# Patient Record
Sex: FEMALE | Race: WHITE | NOT HISPANIC OR LATINO | Employment: FULL TIME | URBAN - METROPOLITAN AREA
[De-identification: names, ages, dates, MRNs, and addresses within clinical notes are randomized per-mention and may not be internally consistent; named-entity substitution may affect disease eponyms.]

---

## 2023-04-24 ENCOUNTER — TELEPHONE (OUTPATIENT)
Dept: PRIMARY CARE | Facility: CLINIC | Age: 58
End: 2023-04-24
Payer: COMMERCIAL

## 2023-04-25 DIAGNOSIS — E78.2 MIXED HYPERLIPIDEMIA: Primary | ICD-10-CM

## 2023-05-19 ENCOUNTER — APPOINTMENT (OUTPATIENT)
Dept: PRIMARY CARE | Facility: CLINIC | Age: 58
End: 2023-05-19
Payer: COMMERCIAL

## 2023-12-08 ENCOUNTER — LAB (OUTPATIENT)
Dept: LAB | Facility: LAB | Age: 58
End: 2023-12-08
Payer: COMMERCIAL

## 2023-12-08 ENCOUNTER — OFFICE VISIT (OUTPATIENT)
Dept: OBSTETRICS AND GYNECOLOGY | Facility: CLINIC | Age: 58
End: 2023-12-08
Payer: COMMERCIAL

## 2023-12-08 VITALS
SYSTOLIC BLOOD PRESSURE: 110 MMHG | WEIGHT: 142 LBS | DIASTOLIC BLOOD PRESSURE: 78 MMHG | BODY MASS INDEX: 24.24 KG/M2 | HEIGHT: 64 IN

## 2023-12-08 DIAGNOSIS — Z12.4 CERVICAL CANCER SCREENING: ICD-10-CM

## 2023-12-08 DIAGNOSIS — Z01.419 WELL WOMAN EXAM: Primary | ICD-10-CM

## 2023-12-08 DIAGNOSIS — E78.2 MIXED HYPERLIPIDEMIA: ICD-10-CM

## 2023-12-08 DIAGNOSIS — Z12.31 BREAST CANCER SCREENING BY MAMMOGRAM: ICD-10-CM

## 2023-12-08 LAB
CHOLEST SERPL-MCNC: 208 MG/DL (ref 0–199)
CHOLESTEROL/HDL RATIO: 3.1
HDLC SERPL-MCNC: 66.2 MG/DL
LDLC SERPL CALC-MCNC: 125 MG/DL
NON HDL CHOLESTEROL: 142 MG/DL (ref 0–149)
TRIGL SERPL-MCNC: 82 MG/DL (ref 0–149)
VLDL: 16 MG/DL (ref 0–40)

## 2023-12-08 PROCEDURE — 99396 PREV VISIT EST AGE 40-64: CPT | Performed by: OBSTETRICS & GYNECOLOGY

## 2023-12-08 PROCEDURE — 1036F TOBACCO NON-USER: CPT | Performed by: OBSTETRICS & GYNECOLOGY

## 2023-12-08 PROCEDURE — 87624 HPV HI-RISK TYP POOLED RSLT: CPT

## 2023-12-08 PROCEDURE — 36415 COLL VENOUS BLD VENIPUNCTURE: CPT

## 2023-12-08 PROCEDURE — 88175 CYTOPATH C/V AUTO FLUID REDO: CPT

## 2023-12-08 PROCEDURE — 80061 LIPID PANEL: CPT

## 2023-12-08 RX ORDER — ASCORBIC ACID 500 MG
TABLET,CHEWABLE ORAL
COMMUNITY
Start: 2016-03-14

## 2023-12-08 RX ORDER — ACETAMINOPHEN 500 MG
TABLET ORAL
COMMUNITY

## 2023-12-08 RX ORDER — AMMONIUM LACTATE 12 G/100G
CREAM TOPICAL
COMMUNITY
Start: 2018-03-28

## 2023-12-08 ASSESSMENT — ENCOUNTER SYMPTOMS
DIARRHEA: 0
BACK PAIN: 0
ABDOMINAL PAIN: 0
DYSURIA: 0
FREQUENCY: 0
CHILLS: 0
FLANK PAIN: 0
SLEEP DISTURBANCE: 0
HEMATURIA: 0
FEVER: 0
UNEXPECTED WEIGHT CHANGE: 0
APPETITE CHANGE: 0
FATIGUE: 0
SHORTNESS OF BREATH: 0
VOMITING: 0
CONSTIPATION: 0
NAUSEA: 0
COLOR CHANGE: 0
ABDOMINAL DISTENTION: 0
BLOOD IN STOOL: 0

## 2023-12-08 ASSESSMENT — PAIN SCALES - GENERAL: PAINLEVEL: 0-NO PAIN

## 2023-12-08 NOTE — PROGRESS NOTES
"Yudith Almeida is a 58 y.o.  here for well woman exam.    Concerns: none  Exercise: walking, light weight lifting    GynHx:  Menopausal.  Denies spotting or bleeding.   Last pap :   Colonoscopy: at age 49 yo    No LMP recorded. Patient is postmenopausal.       OB History          0    Para   0    Term   0       0    AB   0    Living   0         SAB   0    IAB   0    Ectopic   0    Multiple   0    Live Births   0               Past med hx and past surg hx reviewed and notable for: lipoma removed from left thigh    Objective   /78   Ht 1.626 m (5' 4\")   Wt 64.4 kg (142 lb)   BMI 24.37 kg/m²     Review of Systems   Constitutional:  Negative for appetite change, chills, fatigue, fever and unexpected weight change.   Respiratory:  Negative for shortness of breath.    Cardiovascular:  Negative for chest pain.   Gastrointestinal:  Negative for abdominal distention, abdominal pain, blood in stool, constipation, diarrhea, nausea and vomiting.   Endocrine: Negative for cold intolerance and heat intolerance.   Genitourinary:  Negative for dyspareunia, dysuria, flank pain, frequency, genital sores, hematuria, menstrual problem, pelvic pain, urgency, vaginal bleeding, vaginal discharge and vaginal pain.   Musculoskeletal:  Negative for back pain.   Skin:  Negative for color change.   Psychiatric/Behavioral:  Negative for sleep disturbance.        Physical Exam  Constitutional:       Appearance: Normal appearance.   HENT:      Head: Normocephalic and atraumatic.   Chest:   Breasts:     Right: Normal.      Left: Normal.   Abdominal:      General: Abdomen is flat.      Palpations: Abdomen is soft.      Tenderness: There is no abdominal tenderness.   Genitourinary:     General: Normal vulva.      Vagina: Erythema (atrophy) present.      Cervix: Normal.      Uterus: Normal.       Adnexa: Right adnexa normal and left adnexa normal.      Comments: Pap collected today   Skin:     General: Skin is warm " and dry.   Neurological:      Mental Status: She is alert and oriented to person, place, and time.   Psychiatric:         Mood and Affect: Mood normal.          Assessment and Plan:  Routine Well Woman Exam Today.   Discussed diet and exercise and routine health screening.   Pap: pap done today   Recommend annual mammograms. Order placed.   Currently up to date on colon cancer screening.         No orders of the defined types were placed in this encounter.

## 2023-12-14 ENCOUNTER — OFFICE VISIT (OUTPATIENT)
Dept: PRIMARY CARE | Facility: CLINIC | Age: 58
End: 2023-12-14
Payer: COMMERCIAL

## 2023-12-14 VITALS — DIASTOLIC BLOOD PRESSURE: 72 MMHG | BODY MASS INDEX: 24.37 KG/M2 | WEIGHT: 142 LBS | SYSTOLIC BLOOD PRESSURE: 121 MMHG

## 2023-12-14 DIAGNOSIS — M54.6 CHRONIC THORACIC BACK PAIN, UNSPECIFIED BACK PAIN LATERALITY: ICD-10-CM

## 2023-12-14 DIAGNOSIS — E78.2 MIXED HYPERLIPIDEMIA: ICD-10-CM

## 2023-12-14 DIAGNOSIS — G89.29 CHRONIC THORACIC BACK PAIN, UNSPECIFIED BACK PAIN LATERALITY: ICD-10-CM

## 2023-12-14 DIAGNOSIS — Z00.00 HEALTH CARE MAINTENANCE: Primary | ICD-10-CM

## 2023-12-14 PROCEDURE — 1036F TOBACCO NON-USER: CPT | Performed by: INTERNAL MEDICINE

## 2023-12-14 PROCEDURE — 93000 ELECTROCARDIOGRAM COMPLETE: CPT | Performed by: INTERNAL MEDICINE

## 2023-12-14 PROCEDURE — 99396 PREV VISIT EST AGE 40-64: CPT | Performed by: INTERNAL MEDICINE

## 2023-12-14 NOTE — PROGRESS NOTES
Subjective   Patient ID: Yudith Almeida is a 58 y.o. female who presents for No chief complaint on file..    HPI CPE see updated front sheet no chest pain no shortness of breath no side effect with medication discussed with recent GYN visit lipid values improved previous blood work she has changed jobs now at [employer] from lupus all has been watching diet some exercise her lipoma on the scapula has been slightly more bothersome recall she had the leg lipoma which was ultimately needing surgery because of symptoms bowels normal no dysuria    Past medical history hyperlipidemia    Medications none    Allergies noted and unchanged    Social history no tobacco alcohol social    Family history noted and unchanged    Prevention some exercise prior blood work reviewed scheduled for mammogram had 0 coronary artery calcium CT score due for colonoscopy around age 60    Review of Systems    Objective   There were no vitals taken for this visit.  Vital signs noted alert and oriented x 3 NCAT PERRLA EOMI nares without discharge OP benign TM normal bilateral EAC clear bilateral no AC nodes no JVD or bruit no lid lag no thyromegaly chest clear to auscultation CV regular rate and rhythm S1-S2 without murmur gallop or rub abdomen soft nontender normal active bowel sounds LS spine normal curvature negative straight leg raise extremities no clubbing cyanosis or edema normal distal pulses DTR 2+ skin thoracic back left-sided barely perceptible lower scapular lipomatous area small nontender  Physical Exam    Assessment/Plan impression General medical examination hyperlipidemia thoracic back  Plan discussed with risk-benefit side effects of surgery or surgical consultation she will consider for her back when and if it bothers her more regarding her soft tissue previous blood work reviewed previous coronary artery calcium CT scan reviewed she was scheduled for her mammogram good diet regular exercise increase water consumption she  has had the COVID vaccination she has had the flu vaccination no lipid therapy is needed at the current time but will continue to recheck and follow-up based on above 6 months TT 60 cc 31    Addendum   she will try not sleeping on her back or leaning against her back to see if it alleviates any of her perceived tingling there and then follow-up with general surgery if she so chooses or imaging  RAC

## 2024-01-02 LAB
CYTOLOGY CMNT CVX/VAG CYTO-IMP: NORMAL
HPV HR 12 DNA GENITAL QL NAA+PROBE: NEGATIVE
HPV HR GENOTYPES PNL CVX NAA+PROBE: NEGATIVE
HPV16 DNA SPEC QL NAA+PROBE: NEGATIVE
HPV18 DNA SPEC QL NAA+PROBE: NEGATIVE
LAB AP HPV GENOTYPE QUESTION: YES
LAB AP HPV HR: NORMAL
LABORATORY COMMENT REPORT: NORMAL
PATH REPORT.TOTAL CANCER: NORMAL

## 2024-01-13 ENCOUNTER — HOSPITAL ENCOUNTER (OUTPATIENT)
Dept: RADIOLOGY | Facility: HOSPITAL | Age: 59
Discharge: HOME | End: 2024-01-13
Payer: COMMERCIAL

## 2024-01-13 VITALS — HEIGHT: 64 IN | WEIGHT: 140 LBS | BODY MASS INDEX: 23.9 KG/M2

## 2024-01-13 DIAGNOSIS — Z12.31 BREAST CANCER SCREENING BY MAMMOGRAM: ICD-10-CM

## 2024-01-13 PROCEDURE — 77067 SCR MAMMO BI INCL CAD: CPT

## 2024-07-22 ENCOUNTER — TELEPHONE (OUTPATIENT)
Dept: PRIMARY CARE | Facility: CLINIC | Age: 59
End: 2024-07-22

## 2024-07-23 DIAGNOSIS — E78.2 MIXED HYPERLIPIDEMIA: ICD-10-CM

## 2024-07-23 DIAGNOSIS — Z00.00 HEALTH CARE MAINTENANCE: Primary | ICD-10-CM

## 2024-07-26 ENCOUNTER — LAB (OUTPATIENT)
Dept: LAB | Facility: LAB | Age: 59
End: 2024-07-26
Payer: COMMERCIAL

## 2024-07-26 DIAGNOSIS — E78.2 MIXED HYPERLIPIDEMIA: ICD-10-CM

## 2024-07-26 DIAGNOSIS — Z00.00 HEALTH CARE MAINTENANCE: ICD-10-CM

## 2024-07-26 LAB
ALBUMIN SERPL BCP-MCNC: 4.3 G/DL (ref 3.4–5)
ALP SERPL-CCNC: 49 U/L (ref 33–110)
ALT SERPL W P-5'-P-CCNC: 15 U/L (ref 7–45)
ANION GAP SERPL CALC-SCNC: 14 MMOL/L (ref 10–20)
AST SERPL W P-5'-P-CCNC: 17 U/L (ref 9–39)
BILIRUB SERPL-MCNC: 0.4 MG/DL (ref 0–1.2)
BUN SERPL-MCNC: 17 MG/DL (ref 6–23)
CALCIUM SERPL-MCNC: 9.3 MG/DL (ref 8.6–10.3)
CHLORIDE SERPL-SCNC: 103 MMOL/L (ref 98–107)
CHOLEST SERPL-MCNC: 217 MG/DL (ref 0–199)
CHOLESTEROL/HDL RATIO: 4.2
CO2 SERPL-SCNC: 28 MMOL/L (ref 21–32)
CREAT SERPL-MCNC: 0.95 MG/DL (ref 0.5–1.05)
EGFRCR SERPLBLD CKD-EPI 2021: 70 ML/MIN/1.73M*2
ERYTHROCYTE [DISTWIDTH] IN BLOOD BY AUTOMATED COUNT: 12.4 % (ref 11.5–14.5)
GLUCOSE SERPL-MCNC: 87 MG/DL (ref 74–99)
HCT VFR BLD AUTO: 44.4 % (ref 36–46)
HDLC SERPL-MCNC: 52 MG/DL
HGB BLD-MCNC: 14.4 G/DL (ref 12–16)
LDLC SERPL CALC-MCNC: 132 MG/DL
MCH RBC QN AUTO: 31.1 PG (ref 26–34)
MCHC RBC AUTO-ENTMCNC: 32.4 G/DL (ref 32–36)
MCV RBC AUTO: 96 FL (ref 80–100)
NON HDL CHOLESTEROL: 165 MG/DL (ref 0–149)
NRBC BLD-RTO: 0 /100 WBCS (ref 0–0)
PLATELET # BLD AUTO: 183 X10*3/UL (ref 150–450)
POTASSIUM SERPL-SCNC: 4.9 MMOL/L (ref 3.5–5.3)
PROT SERPL-MCNC: 6.6 G/DL (ref 6.4–8.2)
RBC # BLD AUTO: 4.63 X10*6/UL (ref 4–5.2)
SODIUM SERPL-SCNC: 140 MMOL/L (ref 136–145)
TRIGL SERPL-MCNC: 163 MG/DL (ref 0–149)
VLDL: 33 MG/DL (ref 0–40)
WBC # BLD AUTO: 4.1 X10*3/UL (ref 4.4–11.3)

## 2024-07-26 PROCEDURE — 85027 COMPLETE CBC AUTOMATED: CPT

## 2024-07-26 PROCEDURE — 80061 LIPID PANEL: CPT

## 2024-07-26 PROCEDURE — 80053 COMPREHEN METABOLIC PANEL: CPT

## 2024-07-26 PROCEDURE — 36415 COLL VENOUS BLD VENIPUNCTURE: CPT

## 2024-07-31 ENCOUNTER — APPOINTMENT (OUTPATIENT)
Dept: PRIMARY CARE | Facility: CLINIC | Age: 59
End: 2024-07-31
Payer: COMMERCIAL

## 2024-07-31 VITALS
SYSTOLIC BLOOD PRESSURE: 117 MMHG | DIASTOLIC BLOOD PRESSURE: 66 MMHG | WEIGHT: 137 LBS | BODY MASS INDEX: 22.82 KG/M2 | RESPIRATION RATE: 12 BRPM | HEART RATE: 66 BPM | HEIGHT: 65 IN

## 2024-07-31 DIAGNOSIS — L30.8 OTHER ECZEMA: ICD-10-CM

## 2024-07-31 DIAGNOSIS — M17.10 PRIMARY OSTEOARTHRITIS OF KNEE, UNSPECIFIED LATERALITY: ICD-10-CM

## 2024-07-31 DIAGNOSIS — E78.2 MIXED HYPERLIPIDEMIA: Primary | ICD-10-CM

## 2024-07-31 PROCEDURE — 99214 OFFICE O/P EST MOD 30 MIN: CPT | Performed by: INTERNAL MEDICINE

## 2024-07-31 PROCEDURE — 3008F BODY MASS INDEX DOCD: CPT | Performed by: INTERNAL MEDICINE

## 2024-07-31 NOTE — PROGRESS NOTES
Subjective   Patient ID: Yudith Almeida is a 58 y.o. female who presents for No chief complaint on file..    HPI follow up visit see updated front sheet and follow-up visit no chest pain no shortness of breath has been watching her diet but she was off of the omega fish oil when she had her blood work drawn and had slightly elevated triglycerides otherwise working out regularly knees have been okay sometimes limits her ability to run    Past medical history hyperlipidemia osteoarthritis    Medications none omega fish oil    Allergies noted and unchanged    Social history no tobacco alcohol social    Family history noted and unchanged    Prevention some exercise recent blood work reviewed scheduled for mammogram for later had previously had 0 coronary artery calcium CT score due for colonoscopy around age 60    Review of Systems    Objective   There were no vitals taken for this visit.  Vital signs noted alert and oriented x 3 NCAT PERRLA EOMI nares without discharge OP benign TM normal bilateral EAC clear bilateral no AC nodes no JVD or bruit no lid lag no thyromegaly chest clear to auscultation and percussion CV regular rate and rhythm S1-S2 without murmur gallop or rub abdomen soft nontender normal active bowel sounds LS spine normal curvature negative straight leg raise extremities no clubbing cyanosis or edema normal distal pulses DTR 2+ musculoskeletal minimal DJD changes and crepitus of the left knee without effusion or laxity skin dry no open skinPhysical Exam    Assessment/Plan impression General medical examination hyperlipidemia DJD knee or tendinitis other diagnoses eczema  Plan moisturizer daily occasional HC cream if needed zyrtec 10 mg po every day as needed along with ortho as needed good diet regular exercise resume the omega fish oil on a regular basis will change up her exercise program to not always focus on just running may recheck the lipids in the future range of motion exercises for the knee  liniment cream Tylenol try to avoid NSAIDs follow up ortho as needed and recheck in 6 months  tt40 cc21     follow-up/fill out medical paperwork (check)

## 2024-12-02 ENCOUNTER — APPOINTMENT (OUTPATIENT)
Dept: DERMATOLOGY | Facility: CLINIC | Age: 59
End: 2024-12-02
Payer: COMMERCIAL

## 2024-12-02 DIAGNOSIS — L57.9 SKIN CHANGES DUE TO CHRONIC EXPOSURE TO NONIONIZING RADIATION: ICD-10-CM

## 2024-12-02 DIAGNOSIS — L90.5 SCAR CONDITIONS AND FIBROSIS OF SKIN: ICD-10-CM

## 2024-12-02 DIAGNOSIS — L81.4 LENTIGO: ICD-10-CM

## 2024-12-02 DIAGNOSIS — Z85.828 HISTORY OF NONMELANOMA SKIN CANCER: ICD-10-CM

## 2024-12-02 DIAGNOSIS — D22.9 BENIGN NEVUS: ICD-10-CM

## 2024-12-02 DIAGNOSIS — L98.8 RHYTIDES: ICD-10-CM

## 2024-12-02 DIAGNOSIS — L82.1 SEBORRHEIC KERATOSIS: ICD-10-CM

## 2024-12-02 DIAGNOSIS — D18.01 ANGIOMA OF SKIN: Primary | ICD-10-CM

## 2024-12-02 PROCEDURE — 99213 OFFICE O/P EST LOW 20 MIN: CPT | Performed by: NURSE PRACTITIONER

## 2024-12-02 PROCEDURE — 1036F TOBACCO NON-USER: CPT | Performed by: NURSE PRACTITIONER

## 2024-12-02 RX ORDER — TRETINOIN 0.8 MG/G
GEL TOPICAL
Qty: 50 G | Refills: 3 | Status: SHIPPED | OUTPATIENT
Start: 2024-12-02

## 2024-12-02 ASSESSMENT — DERMATOLOGY PATIENT ASSESSMENT
DO YOU USE A TANNING BED: YES, PREVIOUSLY
DO YOU HAVE ANY NEW OR CHANGING LESIONS: NO
HAVE YOU HAD OR DO YOU HAVE A STAPH INFECTION: NO
ARE YOU TRYING TO GET PREGNANT: NO
ARE YOU AN ORGAN TRANSPLANT RECIPIENT: NO
ARE YOU ON BIRTH CONTROL: NO
HAVE YOU HAD OR DO YOU HAVE VASCULAR DISEASE: NO
DO YOU HAVE IRREGULAR MENSTRUAL CYCLES: NO
DO YOU USE SUNSCREEN: DAILY

## 2024-12-02 ASSESSMENT — DERMATOLOGY QUALITY OF LIFE (QOL) ASSESSMENT
RATE HOW BOTHERED YOU ARE BY EFFECTS OF YOUR SKIN PROBLEMS ON YOUR ACTIVITIES (EG, GOING OUT, ACCOMPLISHING WHAT YOU WANT, WORK ACTIVITIES OR YOUR RELATIONSHIPS WITH OTHERS): 1
RATE HOW EMOTIONALLY BOTHERED YOU ARE BY YOUR SKIN PROBLEM (FOR EXAMPLE, WORRY, EMBARRASSMENT, FRUSTRATION): 2
ARE THERE EXCLUSIONS OR EXCEPTIONS FOR THE QUALITY OF LIFE ASSESSMENT: NO
DATE THE QUALITY-OF-LIFE ASSESSMENT WAS COMPLETED: 67176
RATE HOW BOTHERED YOU ARE BY SYMPTOMS OF YOUR SKIN PROBLEM (EG, ITCHING, STINGING BURNING, HURTING OR SKIN IRRITATION): 2

## 2024-12-02 ASSESSMENT — PATIENT GLOBAL ASSESSMENT (PGA): PATIENT GLOBAL ASSESSMENT: PATIENT GLOBAL ASSESSMENT:  2 - MILD

## 2024-12-02 ASSESSMENT — ITCH NUMERIC RATING SCALE: HOW SEVERE IS YOUR ITCHING?: 2

## 2024-12-02 NOTE — PATIENT INSTRUCTIONS

## 2024-12-02 NOTE — PROGRESS NOTES
Subjective     Yudith Almeida is a 59 y.o. female who presents for the following: Skin Check (History of SCC, and BCC.  Has seen private practice providers for skin checks since 2022. Most recent excision last year per patient. H/o Efudex treatment.  Has been using Retin-A-Micro to the face.).     Review of Systems:  No other skin or systemic complaints other than what is documented elsewhere in the note.    The following portions of the chart were reviewed this encounter and updated as appropriate:   Tobacco  Allergies  Meds  Problems  Med Hx  Surg Hx         Skin Cancer History  No skin cancer on file.      Specialty Problems    None       Objective   Well appearing patient in no apparent distress; mood and affect are within normal limits.    A full examination was performed including scalp, head, eyes, ears, nose, lips, neck, chest, axillae, abdomen, back, buttocks, bilateral upper extremities, bilateral lower extremities, hands, feet, fingers, toes, fingernails, and toenails. All findings within normal limits unless otherwise noted below.      Assessment/Plan   1. Angioma of skin  Scattered cherry-red papule(s).    A cherry hemangioma is a small macule (small, flat, smooth area) or papule (small, solid bump) formed from an overgrowth of tiny blood vessels in the skin. Cherry hemangiomas are characteristically red or purplish in color. They often first appear in middle adulthood and usually increase in number with age. Cherry hemangiomas are noncancerous (benign) and are common in adults.    The present appearance of the lesion is not worrisome but it should continue to be observed and testing/treatment may be warranted if change occurs.    Related Procedures  Follow Up In Dermatology - Established Patient    2. Benign nevus (3)  Generalized, Left Thigh - Anterior, Left Thigh - Posterior  Scattered, uniform and benign-appearing, regular brown melanocytic papules and macules.    Left anterior superior thigh  has a 7.5 x 7.5 mm slightly irregular shape tan to dark brown macule. Reticular pattern only but mixture of thin and slightly thicker lines.   Left posterior superior lateral thigh area has a 3 x 4 mm half pink and half tan brown (reticular pattern only)    The present appearance of the lesion is not worrisome but it should continue to be observed and testing/treatment may be warranted if change occurs.    Advised to monitor nevi on the left thigh and if noticing change in shape size or color to notify me. Patient verbalized understanding and agreement.     Related Procedures  Follow Up In Dermatology - Established Patient    3. Seborrheic keratosis  Stuck on verrucous, tan-brown papules and plaques.      Seborrheic keratoses are common noncancerous (benign) growths of unknown cause seen in adults due to a thickening of an area of the top skin layer. Seborrheic keratoses may appear as if they are stuck on to the skin. They have distinct borders, and they may appear as papules (small, solid bumps) or plaques (solid, raised patches that are bigger than a thumbnail). They may be the same color as your skin, or they may be pink, light brown, darker brown, or very dark brown, or sometimes may appear black.    There is no way to prevent new seborrheic keratoses from forming. Seborrheic keratoses can be removed, but removal is considered a cosmetic issue and is usually not covered by insurance.    PLAN  No treatment is needed unless there is irritation from clothing, such as itching or bleeding.  2.   Some lotions containing alpha hydroxy acids, salicylic acid, or urea may make the areas feel smoother with regular use but will not eliminate them.    Related Procedures  Follow Up In Dermatology - Established Patient    4. Lentigo  Scattered tan macules in sun-exposed areas.    A solar lentigo (plural, solar lentigines), also known as a sun-induced freckle or senile lentigo, is a dark (hyperpigmented) lesion caused by  natural or artificial ultraviolet (UV) light. Solar lentigines may be single or multiple. This type of lentigo is different from a simple lentigo (lentigo simplex) because it is caused by exposure to UV light. Solar lentigines are benign, but they do indicate excessive sun exposure, a risk factor for the development of skin cancer.    To prevent solar lentigines, avoid exposure to sunlight in midday (10 AM to 3 PM), wear sun-protective clothing (tightly woven clothes and hats), and apply sunscreen (SPF 30 UVA and UVB block).    The present appearance of the lesion is not worrisome but it should continue to be observed and testing/treatment may be warranted if change occurs.    Related Procedures  Follow Up In Dermatology - Established Patient    5. Scar conditions and fibrosis of skin  Well healed scar at the site(s) of prior treatment with no evidence of recurrence.          The scar is clear, there is no evidence of recurrence.  The present appearance of the scar is not worrisome but it should continue to be observed and testing/treatment may be warranted if change occurs.      Related Procedures  Follow Up In Dermatology - Established Patient    6. History of nonmelanoma skin cancer    ABCDEs of melanoma and atypical moles were discussed with the patient.    Patient was instructed to perform monthly self skin examination.  We recommended that the patient have regular full skin exams given an increased risk of subsequent skin cancers.    The patient was instructed to use sun protective behaviors including use of broad spectrum sunscreens and sun protective clothing to reduce risk of skin cancers.    Warning signs of non-melanoma skin cancer discussed.    Related Procedures  Follow Up In Dermatology - Established Patient    7. Skin changes due to chronic exposure to nonionizing radiation  Actinic changes in the form of freckles, lentigines and hyper/hypopigmentation     ABCDEs of melanoma and atypical moles were  discussed with the patient.    Patient was instructed to perform monthly self skin examination.  We recommended that the patient have regular full skin exams given an increased risk of subsequent skin cancers.    The patient was instructed to use sun protective behaviors including use of broad spectrum sunscreens and sun protective clothing to reduce risk of skin cancers.    Warning signs of non-melanoma skin cancer discussed.    Related Procedures  Follow Up In Dermatology - Established Patient    8. Rhytides  Face    Continue with retin-a micro     Related Procedures  Follow Up In Dermatology - Established Patient    Related Medications  tretinoin microspheres (Retin-A Micro Pump) 0.08 % gel with pump  Apply to face at bedtime as directed      Medical records from [facility] requested    Return in 6 months for routine skin check or return to clinic sooner if needed

## 2025-04-15 ENCOUNTER — TELEPHONE (OUTPATIENT)
Facility: CLINIC | Age: 60
End: 2025-04-15
Payer: COMMERCIAL

## 2025-04-15 DIAGNOSIS — Z12.31 BREAST CANCER SCREENING BY MAMMOGRAM: ICD-10-CM

## 2025-05-31 ENCOUNTER — HOSPITAL ENCOUNTER (OUTPATIENT)
Dept: RADIOLOGY | Facility: HOSPITAL | Age: 60
Discharge: HOME | End: 2025-05-31
Payer: COMMERCIAL

## 2025-05-31 VITALS — BODY MASS INDEX: 23.39 KG/M2 | WEIGHT: 137 LBS | HEIGHT: 64 IN

## 2025-05-31 DIAGNOSIS — Z12.31 BREAST CANCER SCREENING BY MAMMOGRAM: ICD-10-CM

## 2025-05-31 PROCEDURE — 77067 SCR MAMMO BI INCL CAD: CPT

## 2025-05-31 PROCEDURE — 77063 BREAST TOMOSYNTHESIS BI: CPT | Performed by: STUDENT IN AN ORGANIZED HEALTH CARE EDUCATION/TRAINING PROGRAM

## 2025-05-31 PROCEDURE — 77067 SCR MAMMO BI INCL CAD: CPT | Performed by: STUDENT IN AN ORGANIZED HEALTH CARE EDUCATION/TRAINING PROGRAM

## 2025-06-02 ENCOUNTER — APPOINTMENT (OUTPATIENT)
Dept: DERMATOLOGY | Facility: CLINIC | Age: 60
End: 2025-06-02
Payer: COMMERCIAL

## 2025-06-02 ENCOUNTER — OFFICE VISIT (OUTPATIENT)
Dept: PRIMARY CARE | Facility: CLINIC | Age: 60
End: 2025-06-02
Payer: COMMERCIAL

## 2025-06-02 VITALS — HEART RATE: 80 BPM | RESPIRATION RATE: 12 BRPM

## 2025-06-02 DIAGNOSIS — L90.5 SCAR CONDITIONS AND FIBROSIS OF SKIN: ICD-10-CM

## 2025-06-02 DIAGNOSIS — D18.01 ANGIOMA OF SKIN: ICD-10-CM

## 2025-06-02 DIAGNOSIS — L81.4 LENTIGO: ICD-10-CM

## 2025-06-02 DIAGNOSIS — N63.25 MASS OVERLAPPING MULTIPLE QUADRANTS OF LEFT BREAST: Primary | ICD-10-CM

## 2025-06-02 DIAGNOSIS — D48.5 NEOPLASM OF UNCERTAIN BEHAVIOR OF SKIN: ICD-10-CM

## 2025-06-02 DIAGNOSIS — Z85.828 HISTORY OF NONMELANOMA SKIN CANCER: ICD-10-CM

## 2025-06-02 DIAGNOSIS — L57.9 SKIN CHANGES DUE TO CHRONIC EXPOSURE TO NONIONIZING RADIATION: ICD-10-CM

## 2025-06-02 DIAGNOSIS — L98.8 RHYTIDES: ICD-10-CM

## 2025-06-02 DIAGNOSIS — L72.0 EPIDERMAL CYST: Primary | ICD-10-CM

## 2025-06-02 DIAGNOSIS — D22.9 BENIGN NEVUS: ICD-10-CM

## 2025-06-02 DIAGNOSIS — F41.9 ANXIETY: ICD-10-CM

## 2025-06-02 DIAGNOSIS — L82.1 SEBORRHEIC KERATOSIS: ICD-10-CM

## 2025-06-02 PROCEDURE — 99213 OFFICE O/P EST LOW 20 MIN: CPT | Performed by: INTERNAL MEDICINE

## 2025-06-02 PROCEDURE — 1036F TOBACCO NON-USER: CPT | Performed by: NURSE PRACTITIONER

## 2025-06-02 PROCEDURE — 99213 OFFICE O/P EST LOW 20 MIN: CPT | Performed by: NURSE PRACTITIONER

## 2025-06-02 PROCEDURE — 11102 TANGNTL BX SKIN SINGLE LES: CPT | Performed by: NURSE PRACTITIONER

## 2025-06-02 RX ORDER — TRETINOIN 0.8 MG/G
GEL TOPICAL
Qty: 50 G | Refills: 3 | Status: SHIPPED | OUTPATIENT
Start: 2025-06-02

## 2025-06-02 NOTE — Clinical Note
"Epidermoid cysts, sometimes called sebaceous cysts, contain a soft, \"cheesy\" material composed of keratin, a protein component of skin, hair, and nails. Epidermoid cysts form when the top layer of skin (the epidermis) grows into the middle layer of the skin (the dermis). This may occur due to injury or blocked hair follicles. An epidermoid cyst may have no pain or other symptoms associated with it, but if it becomes inflamed or infected, it may grow, become painful and red, and may rupture.    The risk and benefits of excision was discussed, including scarring, infection, bleeding, incomplete excision, restricted activity and the need for additional procedures if the lesion is not completely removed. Patient declined referral at this time.     "

## 2025-06-02 NOTE — PATIENT INSTRUCTIONS

## 2025-06-02 NOTE — Clinical Note
A dome-shaped, firm, skin-colored nodule that is freely movable on palpation with or without connecting punctum    Mid epigastric area ~1 cm  Left inframammary ~2 cm

## 2025-06-02 NOTE — Clinical Note
Scattered, uniform and benign-appearing, regular brown melanocytic papules and macules.    Left anterior superior thigh has a 7.5 x 7.5 mm slightly irregular shape tan to dark brown macule. Reticular pattern only but mixture of thin and slightly thicker lines.   Left posterior superior lateral thigh area has a 3 x 4 mm half pink and half tan brown (reticular pattern only)

## 2025-06-02 NOTE — PROGRESS NOTES
"Subjective     Yudith Almeida is a 59 y.o. female who presents for the following: Skin Check.     Review of Systems:  No other skin or systemic complaints other than what is documented elsewhere in the note.    The following portions of the chart were reviewed this encounter and updated as appropriate:   Tobacco  Allergies  Meds  Problems  Med Hx  Surg Hx         Skin Cancer History  Biopsy Log Book  No skin cancers from Specimen Tracking.    Additional History      Specialty Problems    None       Objective   Well appearing patient in no apparent distress; mood and affect are within normal limits.    A full examination was performed including scalp, head, eyes, ears, nose, lips, neck, chest, axillae, abdomen, back, buttocks, bilateral upper extremities, bilateral lower extremities, hands, feet, fingers, toes, fingernails, and toenails. All findings within normal limits unless otherwise noted below.      Assessment/Plan   Skin Exam  1. EPIDERMAL CYST (2)  Left Abdomen (side) - Upper, Right Abdomen (side) - Upper  A dome-shaped, firm, skin-colored nodule that is freely movable on palpation with or without connecting punctum    Mid epigastric area ~1 cm  Left inframammary ~2 cm  Epidermoid cysts, sometimes called sebaceous cysts, contain a soft, \"cheesy\" material composed of keratin, a protein component of skin, hair, and nails. Epidermoid cysts form when the top layer of skin (the epidermis) grows into the middle layer of the skin (the dermis). This may occur due to injury or blocked hair follicles. An epidermoid cyst may have no pain or other symptoms associated with it, but if it becomes inflamed or infected, it may grow, become painful and red, and may rupture.    The risk and benefits of excision was discussed, including scarring, infection, bleeding, incomplete excision, restricted activity and the need for additional procedures if the lesion is not completely removed. Patient declined referral at this " time.     Related Procedures  Follow Up In Dermatology - Established Patient  2. ANGIOMA OF SKIN  Generalized  Scattered cherry-red papule(s).  A cherry hemangioma is a small macule (small, flat, smooth area) or papule (small, solid bump) formed from an overgrowth of tiny blood vessels in the skin. Cherry hemangiomas are characteristically red or purplish in color. They often first appear in middle adulthood and usually increase in number with age. Cherry hemangiomas are noncancerous (benign) and are common in adults.    The present appearance of the lesion is not worrisome but it should continue to be observed and testing/treatment may be warranted if change occurs.  Related Procedures  Follow Up In Dermatology - Established Patient  Follow Up In Dermatology - Established Patient  3. BENIGN NEVUS (3)  Generalized, Left Thigh - Anterior, Left Thigh - Posterior  Scattered, uniform and benign-appearing, regular brown melanocytic papules and macules.    Left anterior superior thigh has a 7.5 x 7.5 mm slightly irregular shape tan to dark brown macule. Reticular pattern only but mixture of thin and slightly thicker lines.   Left posterior superior lateral thigh area has a 3 x 4 mm half pink and half tan brown (reticular pattern only)  The present appearance of the lesion is not worrisome but it should continue to be observed and testing/treatment may be warranted if change occurs.    Advised to monitor nevi on the left thigh and if noticing change in shape size or color to notify me. Patient verbalized understanding and agreement.   Related Procedures  Follow Up In Dermatology - Established Patient  Follow Up In Dermatology - Established Patient  4. SEBORRHEIC KERATOSIS  Generalized  Stuck on verrucous, tan-brown papules and plaques.    Seborrheic keratoses are common noncancerous (benign) growths of unknown cause seen in adults due to a thickening of an area of the top skin layer. Seborrheic keratoses may appear as if  they are stuck on to the skin. They have distinct borders, and they may appear as papules (small, solid bumps) or plaques (solid, raised patches that are bigger than a thumbnail). They may be the same color as your skin, or they may be pink, light brown, darker brown, or very dark brown, or sometimes may appear black.    There is no way to prevent new seborrheic keratoses from forming. Seborrheic keratoses can be removed, but removal is considered a cosmetic issue and is usually not covered by insurance.    PLAN  No treatment is needed unless there is irritation from clothing, such as itching or bleeding.  2.   Some lotions containing alpha hydroxy acids, salicylic acid, or urea may make the areas feel smoother with regular use but will not eliminate them.  Related Procedures  Follow Up In Dermatology - Established Patient  Follow Up In Dermatology - Established Patient  5. LENTIGO  Generalized  Scattered tan macules in sun-exposed areas.  A solar lentigo (plural, solar lentigines), also known as a sun-induced freckle or senile lentigo, is a dark (hyperpigmented) lesion caused by natural or artificial ultraviolet (UV) light. Solar lentigines may be single or multiple. This type of lentigo is different from a simple lentigo (lentigo simplex) because it is caused by exposure to UV light. Solar lentigines are benign, but they do indicate excessive sun exposure, a risk factor for the development of skin cancer.    To prevent solar lentigines, avoid exposure to sunlight in midday (10 AM to 3 PM), wear sun-protective clothing (tightly woven clothes and hats), and apply sunscreen (SPF 30 UVA and UVB block).    The present appearance of the lesion is not worrisome but it should continue to be observed and testing/treatment may be warranted if change occurs.  Related Procedures  Follow Up In Dermatology - Established Patient  Follow Up In Dermatology - Established Patient  6. SCAR CONDITIONS AND FIBROSIS OF SKIN (5)  Left  Anterior Mandible, Right Forehead, Right Lower Leg - Anterior (2), Right Nasal Sidewall  Well healed scar at the site(s) of prior treatment with no evidence of recurrence.        The scar is clear, there is no evidence of recurrence.  The present appearance of the scar is not worrisome but it should continue to be observed and testing/treatment may be warranted if change occurs.    Related Procedures  Follow Up In Dermatology - Established Patient  Follow Up In Dermatology - Established Patient  7. HISTORY OF NONMELANOMA SKIN CANCER  Generalized  ABCDEs of melanoma and atypical moles were discussed with the patient.    Patient was instructed to perform monthly self skin examination.  We recommended that the patient have regular full skin exams given an increased risk of subsequent skin cancers.    The patient was instructed to use sun protective behaviors including use of broad spectrum sunscreens and sun protective clothing to reduce risk of skin cancers.    Warning signs of non-melanoma skin cancer discussed.  Related Procedures  Follow Up In Dermatology - Established Patient  Follow Up In Dermatology - Established Patient  8. SKIN CHANGES DUE TO CHRONIC EXPOSURE TO NONIONIZING RADIATION  Generalized  Actinic changes in the form of freckles, lentigines and hyper/hypopigmentation   ABCDEs of melanoma and atypical moles were discussed with the patient.    Patient was instructed to perform monthly self skin examination.  We recommended that the patient have regular full skin exams given an increased risk of subsequent skin cancers.    The patient was instructed to use sun protective behaviors including use of broad spectrum sunscreens and sun protective clothing to reduce risk of skin cancers.    Warning signs of non-melanoma skin cancer discussed.  Related Procedures  Follow Up In Dermatology - Established Patient  Follow Up In Dermatology - Established Patient  9. RHYTIDES  Face  Continue with retin-a micro    Related Procedures  Follow Up In Dermatology - Established Patient  Follow Up In Dermatology - Established Patient  Related Medications  tretinoin microspheres (Retin-A Micro Pump) 0.08 % gel with pump  Apply to face at bedtime as directed  10. NEOPLASM OF UNCERTAIN BEHAVIOR OF SKIN  Left Anterior Neck  4.5 mm red scaly papule    Lesion biopsy  Type of biopsy: tangential    Informed consent: discussed and consent obtained    Timeout: patient name, date of birth, surgical site, and procedure verified    Procedure prep:  Patient was prepped and draped  Anesthesia: the lesion was anesthetized in a standard fashion    Anesthetic:  1% lidocaine plain local infiltration  Instrument used: DermaBlade    Hemostasis achieved with: electrodesiccation    Outcome: patient tolerated procedure well    Post-procedure details: wound care instructions given    Additional details:  Cleaned area with isopropyl alcohol prior to anesthesia or biopsy. Applied thin layer of vaseline and covered with bandaid after procedure      Staff Communication: Dermatology Local Anesthesia: 1 % Plain Lidocaine - Amount: 0.5 ml  Specimen 1 - Dermatopathology- DERM LAB  Differential Diagnosis: NMSC vs ISK  Check Margins Yes/No?:    Comments:    Dermpath Lab: Routine Histopathology (formalin-fixed tissue)  NUB  - Given uncertainty in clinical diagnosis, shave biopsy is recommended in clinic today.  - The patient expressed understanding, is in agreement with this plan, and wishes to proceed with biopsy.  - Oral and written wound care instructions provided.  - Advised patient that the office will call within 2 weeks to discuss biopsy results.    Related Procedures  Follow Up In Dermatology - Established Patient      Return in 6 months for routine skin check or return to clinic sooner if needed

## 2025-06-02 NOTE — Clinical Note
The present appearance of the lesion is not worrisome but it should continue to be observed and testing/treatment may be warranted if change occurs.    Advised to monitor nevi on the left thigh and if noticing change in shape size or color to notify me. Patient verbalized understanding and agreement.

## 2025-06-02 NOTE — PROGRESS NOTES
Subjective   Patient ID: Yudith Almeida is a 59 y.o. female who presents for No chief complaint on file..    HPI check visit from the left breast lump scheduled for mammogram there is a focal asymmetry there are has some diagnostic scheduled ultrasound requisition made but not yet scheduled she has been going through her GYN no family history of breast cancer no nipple discharge no axillary adenopathy right breast appears normal    Review of Systems    Objective   There were no vitals taken for this visit.    Physical Exam vital signs noted alert and oriented x 3 NCAT no JVD breathing unlabored CV regular rate and rhythm S1-S2 chest wall left breast medial quarter-sized density superficial towards the skin no adenopathy extremities no clubbing cyanosis or edema normal distal pulses    Assessment/Plan   {Assess/PlanSmartLinks:56738}     Impression breast lump anxiety  Plan will schedule for her follow-up examinations follow-up with her GYN good diet and exercise relaxation techniques nonpharmacological and recheck at any time and for regular visit and after scans

## 2025-06-03 ENCOUNTER — TELEPHONE (OUTPATIENT)
Dept: OBSTETRICS AND GYNECOLOGY | Facility: CLINIC | Age: 60
End: 2025-06-03
Payer: COMMERCIAL

## 2025-06-03 NOTE — TELEPHONE ENCOUNTER
Called pt, no answer, left voicemail for return call   ----- Message from Dom Marroquin sent at 6/2/2025  5:05 PM EDT -----  Please call the patient regarding her abnormal result. Can you all check into this?  I have never seen the patient so I'm not sure who I am attached to this order.  She does need additional imaging   of her left breast.   ----- Message -----  From: Interface, Radiology Results In  Sent: 6/2/2025   3:13 PM EDT  To: Dom Marroquin MD

## 2025-06-03 NOTE — TELEPHONE ENCOUNTER
Contacted pt  Name and  verified   Reviewed mammogram results, notified additional views of the left breast are needed. Order are in the system. Pt agreeable. Inquired about activity, advised pt to continue with regular activity until advised differently by provider.   ----- Message from Dom Marroquin sent at 2025  5:05 PM EDT -----  Please call the patient regarding her abnormal result. Can you all check into this?  I have never seen the patient so I'm not sure who I am attached to this order.  She does need additional imaging   of her left breast.   ----- Message -----  From: Interface, Radiology Results In  Sent: 2025   3:13 PM EDT  To: Dom Marroquin MD

## 2025-06-04 LAB
LABORATORY COMMENT REPORT: NORMAL
PATH REPORT.FINAL DX SPEC: NORMAL
PATH REPORT.GROSS SPEC: NORMAL
PATH REPORT.MICROSCOPIC SPEC OTHER STN: NORMAL
PATH REPORT.RELEVANT HX SPEC: NORMAL
PATH REPORT.TOTAL CANCER: NORMAL

## 2025-06-05 DIAGNOSIS — C44.41 BASAL CELL CARCINOMA (BCC) OF LEFT SIDE OF NECK: ICD-10-CM

## 2025-06-06 ENCOUNTER — HOSPITAL ENCOUNTER (OUTPATIENT)
Dept: RADIOLOGY | Facility: HOSPITAL | Age: 60
Discharge: HOME | End: 2025-06-06
Payer: COMMERCIAL

## 2025-06-06 DIAGNOSIS — R92.8 OTHER ABNORMAL AND INCONCLUSIVE FINDINGS ON DIAGNOSTIC IMAGING OF BREAST: ICD-10-CM

## 2025-06-06 PROCEDURE — 77061 BREAST TOMOSYNTHESIS UNI: CPT | Mod: LT

## 2025-06-06 PROCEDURE — 76642 ULTRASOUND BREAST LIMITED: CPT | Mod: LT

## 2025-06-06 PROCEDURE — 76982 USE 1ST TARGET LESION: CPT

## 2025-06-12 DIAGNOSIS — R92.8 ABNORMAL MAMMOGRAM: Primary | ICD-10-CM

## 2025-06-16 ENCOUNTER — PRE-ADMISSION TESTING (OUTPATIENT)
Dept: PREADMISSION TESTING | Facility: HOSPITAL | Age: 60
End: 2025-06-16
Payer: COMMERCIAL

## 2025-06-16 ENCOUNTER — APPOINTMENT (OUTPATIENT)
Dept: SURGICAL ONCOLOGY | Facility: CLINIC | Age: 60
End: 2025-06-16
Payer: COMMERCIAL

## 2025-06-16 ENCOUNTER — HOSPITAL ENCOUNTER (OUTPATIENT)
Dept: RADIOLOGY | Facility: HOSPITAL | Age: 60
Discharge: HOME | End: 2025-06-16
Payer: COMMERCIAL

## 2025-06-16 VITALS
OXYGEN SATURATION: 100 % | RESPIRATION RATE: 18 BRPM | DIASTOLIC BLOOD PRESSURE: 84 MMHG | HEART RATE: 67 BPM | SYSTOLIC BLOOD PRESSURE: 139 MMHG

## 2025-06-16 VITALS
DIASTOLIC BLOOD PRESSURE: 95 MMHG | SYSTOLIC BLOOD PRESSURE: 149 MMHG | HEART RATE: 70 BPM | RESPIRATION RATE: 16 BRPM | TEMPERATURE: 96.8 F | OXYGEN SATURATION: 98 %

## 2025-06-16 DIAGNOSIS — R92.8 ABNORMAL MAMMOGRAM: ICD-10-CM

## 2025-06-16 DIAGNOSIS — R92.8 ABNORMAL MAMMOGRAM: Primary | ICD-10-CM

## 2025-06-16 PROCEDURE — C1819 TISSUE LOCALIZATION-EXCISION: HCPCS

## 2025-06-16 PROCEDURE — 77065 DX MAMMO INCL CAD UNI: CPT | Mod: LEFT SIDE | Performed by: RADIOLOGY

## 2025-06-16 PROCEDURE — 99202 OFFICE O/P NEW SF 15 MIN: CPT | Performed by: NURSE PRACTITIONER

## 2025-06-16 PROCEDURE — 19083 BX BREAST 1ST LESION US IMAG: CPT | Mod: LT

## 2025-06-16 PROCEDURE — 19083 BX BREAST 1ST LESION US IMAG: CPT | Mod: LEFT SIDE | Performed by: RADIOLOGY

## 2025-06-16 PROCEDURE — C1739 HC OR 278 NO HCPCS: HCPCS

## 2025-06-16 PROCEDURE — 2780000003 HC OR 278 NO HCPCS

## 2025-06-16 ASSESSMENT — ENCOUNTER SYMPTOMS
SHORTNESS OF BREATH: 0
CHILLS: 0
FEVER: 0

## 2025-06-16 NOTE — CPM/PAT H&P
CPM/PAT Evaluation       Name: Yudith Almeida (Yudith Almeida)  /Age: 1965/59 y.o.     In-Person     This is a 59 y.o. female here for evaluation after abnormal mammogram and recommended biopsy.     Patient is here for H+P prior to biopsy.      Sx: Reports about 2 weeks ago she felt a lump in her left breast. Gets some intermittent pinging type pain     Findings: Solid mass in the left breast as above, suspicious for malignancy. Ultrasound-guided biopsy recommended     Prior cancer: Squamous and basal cell carcinoma      Pertinent GYN history: no hx of GYN surgeries, never pregnant. Never had abnormal PAP.      Medical History[1]    Surgical History[2]    Patient  has no history on file for sexual activity.    Family History[3]    Allergies[4]    Prior to Admission medications    Medication Sig Start Date End Date Taking? Authorizing Provider   ammonium lactate (Amlactin) 12 % cream 1 Application 3/28/18   Historical Provider, MD   ascorbic acid (Vitamin C) 500 mg chewable tablet Chew. 3/14/16   Historical Provider, MD   ASCORBIC ACID, VITAMIN C, ORAL Take by mouth once daily.    Historical Provider, MD   BIOTIN ORAL Take by mouth.  Patient not taking: Reported on 2024    Historical Provider, MD   calcium/magnesium/capsai/gingr (CA CARB-MAGNESIUM-CAPS-RANDI ORAL) Take by mouth.    Historical Provider, MD   cholecalciferol (Vitamin D-3) 5,000 Units tablet Take by mouth once daily.    Historical Provider, MD   MAGNESIUM ORAL Take by mouth once daily.    Historical Provider, MD   tretinoin microspheres (Retin-A Micro Pump) 0.08 % gel with pump Apply to face at bedtime as directed 25   HARMAN Ruiz-CNP        PAT ROS:   Constitutional:    no fever   no chills  Neuro/Psych:   Eyes:   Ears:   Nose:   Mouth:   Throat:   Neck:   Cardio:    no chest pain  Respiratory:    no shortness of breath  Endocrine:   GI:   :   Musculoskeletal:   Hematologic:   Skin:      Physical Exam  Constitutional:        General: She is not in acute distress.  Cardiovascular:      Rate and Rhythm: Normal rate and regular rhythm.   Pulmonary:      Effort: Pulmonary effort is normal.      Breath sounds: Normal breath sounds.   Skin:     General: Skin is warm and dry.   Neurological:      General: No focal deficit present.      Mental Status: She is alert and oriented to person, place, and time.            Visit Vitals  OB Status Postmenopausal   Smoking Status Never       DASI Risk Score    No data to display       Caprini DVT Assessment    No data to display       Modified Frailty Index    No data to display       [id number] Stroke Risk Points         N/A 0 to 9 Points:      Last Change: N/A          The [id number] risk score (Lip GH, et al. 2009. © 2010 [employer]) quantifies the risk of stroke for a patient with atrial fibrillation. For patients without atrial fibrillation or under the age of 18 this score appears as N/A. Higher score values generally indicate higher risk of stroke.        This score is not applicable to this patient. Components are not calculated.          Revised Cardiac Risk Index    No data to display       Apfel Simplified Score    No data to display       Risk Analysis Index Results This Encounter    No data found in the last 10 encounters.       Prodigy: High Risk  Total Score: 0          ARISCAT Score for Postoperative Pulmonary Complications    No data to display       Bains Perioperative Risk for Myocardial Infarction or Cardiac Arrest (LEWIS)    No data to display         Assessment and Plan:     This is a 59 year old female with abnormal mammogram  - continue with scheduled breast biopsy today  - further recommendations to be determined based on pathology results           [1]   Past Medical History:  Diagnosis Date    Other specified health status 04/17/2014    No pertinent past medical history    Personal history of malignant neoplasm of other organs and systems      History of squamous cell carcinoma    Personal history of other diseases of the respiratory system 10/28/2018    History of sore throat    Personal history of other infectious and parasitic diseases     History of varicella   [2]   Past Surgical History:  Procedure Laterality Date    ANTERIOR CRUCIATE LIGAMENT REPAIR  04/17/2014    Primary Repair Of Knee Ligament Cruciate Anterior    KNEE SURGERY  04/17/2014    Knee Surgery    MOUTH SURGERY  04/17/2014    Oral Surgery Tooth Extraction    OTHER SURGICAL HISTORY  04/17/2014    Knee Arthroscopy With Medial Collateral Ligament Repair    OTHER SURGICAL HISTORY  01/20/2021    Mass excision    OTHER SURGICAL HISTORY  03/14/2016    History Of Prior Surgery   [3] No family history on file.  [4]   Allergies  Allergen Reactions    Amoxicillin Rash

## 2025-06-16 NOTE — DISCHARGE INSTRUCTIONS
Okay to remove band-aid tomorrow  Okay to shower tomorrow  Steri-strips will fall off on own  Okay to use ice as needed for pain  Okay to take tylenol as needed for pain  Follow up with María Wyatt in 10-14 days for results  Resume normal diet  Notify MD with any s/s of infection or active bleeding

## 2025-06-18 ENCOUNTER — APPOINTMENT (OUTPATIENT)
Dept: RADIOLOGY | Facility: CLINIC | Age: 60
End: 2025-06-18
Payer: COMMERCIAL

## 2025-06-23 DIAGNOSIS — C50.212 MALIGNANT NEOPLASM OF UPPER-INNER QUADRANT OF LEFT BREAST IN FEMALE, ESTROGEN RECEPTOR NEGATIVE: ICD-10-CM

## 2025-06-23 DIAGNOSIS — Z17.1 MALIGNANT NEOPLASM OF UPPER-INNER QUADRANT OF LEFT BREAST IN FEMALE, ESTROGEN RECEPTOR NEGATIVE: ICD-10-CM

## 2025-06-23 LAB
LAB AP ASR DISCLAIMER: NORMAL
LAB AP BLOCK FOR ADDITIONAL STUDIES: NORMAL
LABORATORY COMMENT REPORT: NORMAL
PATH REPORT.COMMENTS IMP SPEC: NORMAL
PATH REPORT.FINAL DX SPEC: NORMAL
PATH REPORT.GROSS SPEC: NORMAL
PATH REPORT.RELEVANT HX SPEC: NORMAL
PATH REPORT.TOTAL CANCER: NORMAL
PATHOLOGY SYNOPTIC REPORT: NORMAL

## 2025-06-23 ASSESSMENT — ENCOUNTER SYMPTOMS
GASTROINTESTINAL NEGATIVE: 1
NEUROLOGICAL NEGATIVE: 1
ENDOCRINE NEGATIVE: 1
CONSTITUTIONAL NEGATIVE: 1
MUSCULOSKELETAL NEGATIVE: 1
HEMATOLOGIC/LYMPHATIC NEGATIVE: 1
PSYCHIATRIC NEGATIVE: 1
EYES NEGATIVE: 1
RESPIRATORY NEGATIVE: 1
CARDIOVASCULAR NEGATIVE: 1

## 2025-06-23 NOTE — PROGRESS NOTES
Subjective     Diagnosis date: 25 left breast invasive ductal carcinoma, grade 3, ER <1%, IL <1%, HER2 1+, cT1cN0, stage IB     Cancer Staging   Malignant neoplasm of upper-inner quadrant of left breast in female, estrogen receptor negative  Staging form: Breast, AJCC 8th Edition  - Clinical stage from 2025: Stage IB - Signed by Anita Le MD on 2025  cT1c  cN0  cM0  Maybee grade: G3  ER Status: Negative  IL Status: Negative  HER2 Status: Negative       Yudith Almeida is a 59 y.o. female who was referred by: Dr. Dom Marroquin for evaluation of a screen detected left breast cancer. She presents to the [facility] for treatment recommendations. She proceeded to have diagnostic imaging demonstrating a 1.7cm mass at 11 o'clock.  The area underwent US-guided core biopsy revealing a diagnosis of invasive ductal carcinoma. She denies skin changes or nipple discharge. She has no family history of breast or ovarian cancers.    BREAST IMAGIN25 Bilateral screening mammogram ([facility]) demonstrates extremely dense breast tissue, BI-RADS Category 0, A focal asymmetry with associated calcifications is seen in central left breast at posterior that. No suspicious masses or calcifications are identified in the right breast.  25 left diagnostic mammogram and targeted ultrasound ([place]), indicates BI-RADS Category 4, Previously seen focal area of mass asymmetry with associated calcifications within the upper inner quadrant of the left breast at posterior depth persists in the additional views. At the 11 o'clock position, 9 cm from the nipple there is a hypoechoic fairly homogeneous mass with sharp lobulated borders and increased through transmission measuring approximately 17 mm in size. It shows peripheral vascular flow but no significant posterior shadowing. This is stiff on elastography. Questionable punctate echogenic foci suggestive of calcification. There is mild surrounding  hyperechoic halo. No additional cystic or solid lesions. Evaluation of the left axilla demonstrates benign-appearing lymph nodes with fatty hilum and thin cortex. No suspicious lymph nodes are seen.    BREAST PROCEDURE: 25 left breast, 11:00 9cm from nipple, ultrasound-guided core biopsy.    REPRODUCTIVE HEALTH: menarche at 16, post-menopausal at 55, , with no hormone exposure such as birth control pills or post menopausal estrogen    MEDICAL HISTORY: none    FAMILY CANCER HISTORY:   Mom lung cancer  Dad prostate cancer    MEDICAL ONCOLOGY: referral post op    RADIATION ONCOLOGY: referral post op    GENETICS: meets NCCN criteria, referral placed non-urgent    Cancer-related family history includes Lung cancer in her mother; Prostate cancer in her father.    Review of Systems   Constitutional: Negative.    HENT: Negative.     Eyes: Negative.    Respiratory: Negative.     Cardiovascular: Negative.    Gastrointestinal: Negative.    Endocrine: Negative.    Genitourinary: Negative.    Musculoskeletal: Negative.    Skin: Negative.    Neurological: Negative.    Hematological: Negative.    Psychiatric/Behavioral: Negative.          Objective     Visit Vitals  /84 (BP Location: Right arm, Patient Position: Sitting, BP Cuff Size: Small child)   Pulse 66   Temp 36.7 °C (98.1 °F) (Temporal)   Wt 61.3 kg (135 lb 3.2 oz)   SpO2 98%   BMI 23.21 kg/m²   OB Status Postmenopausal   Smoking Status Never   BSA 1.66 m²        Breast: Exam performed in sitting and supine positions.   cup size: B  RIGHT BREAST EXAM:  Breast: no discrete mass  Skin Erythema: no  Peau d' orange: no  Nipple Inversion: no  Nipple Discharge: no     LEFT BREAST EXAM:  Breast: palpable mass with resolving ecchymosis in the UIQ measuring approximately 2cm  Skin Erythema: no  Attachment of Overlying Skin: no  Peau d' orange: no  Chest Wall Attachment: no  Nipple Inversion: no  Nipple Discharge: no     RIGHT VILMA BASIN EXAM:  Axillary:  negative  Infraclavicular: negative  Supraclavicular: negative     LEFT VILMA BASIN EXAM:  Axillary: negative  Infraclavicular: negative  Supraclavicular: negative        General: Otherwise healthy appearing. Patient is in no acute distress.     HEENT: Conjunctivae are well colored and sclerae nonicteric. Neck is supple, trachea midline. No lymphadenopathy or thyromegaly.     Chest: Respiratory rate and effort normal. No cough.     CV: regular rate and rhythm.     Abdomen: Abdomen is non-distended, non-tender to palpation.     Extremities: Extremities are atraumatic. There is no evidence of lymphedema.    Shoulder abduction test: (raising affected arm(s) from lateral to 180 degrees overhead, one at a time) no limitations     Neurologic: Neurologically intact. Alert and oriented.        Imaging    Images from diagnostic mammogram and ultrasound were reviewed with breast imaging and results were shared with Ms. Almeida today.    Assessment and Plan    The natural history and evolution of breast cancer were discussed with Ms. Almeida and her partner Justin, including the difference between in-situ and invasive carcinoma, and the distinction between local and systemic disease and local and systemic therapy. For local treatment options, I explained the risks and benefits of breast conservation and mastectomy, including the fact that survival rates are equal with these two approaches. If breast conservation is elected, I explained the need for free margins, the possibility of re-excision to achieve free margins, and the need for post-operative radiotherapy. The approach to vilma staging was also described, including the technique, risks and benefits of sentinel node dissection and the long-term sequelae of this procedure including lymphedema risks. With regard to systemic therapy, final recommendation will be made following receipt of final surgical pathology, but I outlined the possibility of chemotherapy with her triple negative  breast cancer.    Ms. Almeida meets NCCN criteria for genetic testing. I discussed the purpose of obtaining this information would be to help us determine the risk of future breast cancers, but it does not tell us anything about the behavior of the cancer she has today. She understands approximately 5-10% of all breast cancers have a genetic component, so the most likely outcome from testing would be negative for a pathogenic variant. Should she be positive and at elevated risk for future breast cancer, we could discuss the value of bilateral mastectomy to address her current breast cancer diagnosis and for risk reduction of future breast cancers. She is interested in this information, but feels comfortable obtaining this in a non-urgent fashion.    The indications, risks, and benefits of MRI evaluation were discussed, particularly with her dense breast tissue. After this discussion we decided to proceed with breast MRI at this time to define extent of disease and rule out any additional areas of malignancy.    From a surgery standpoint, Ms. Almeida is an appropriate candidate for lumpectomy. Using individualized shared decision-making, this would be her preferred approach. The mass is palpable so she does not require a Magseed for localization. Referrals will be placed to medical oncology and radiation oncology post op.    Following this discussion, where all of the patient's questions were answered, we agreed to proceed with breast MRI, non-urgent referral to genetics, anticipating surgery first approach with left lumpectomy, intraoperative lymphatic mapping, axillary sentinel lymph node biopsy. Informed consent was obtained in clinic today and surgery is tentatively scheduled at [facility] for 7/21/25. Ms. Almeida has no chronic co-morbidities that can increase surgical complications.     Anita Le MD

## 2025-06-24 ENCOUNTER — HOSPITAL ENCOUNTER (OUTPATIENT)
Facility: HOSPITAL | Age: 60
Setting detail: OUTPATIENT SURGERY
End: 2025-06-24
Attending: SURGERY | Admitting: SURGERY
Payer: COMMERCIAL

## 2025-06-24 ENCOUNTER — OFFICE VISIT (OUTPATIENT)
Dept: SURGICAL ONCOLOGY | Facility: CLINIC | Age: 60
End: 2025-06-24
Payer: COMMERCIAL

## 2025-06-24 VITALS
TEMPERATURE: 98.1 F | HEART RATE: 66 BPM | BODY MASS INDEX: 23.21 KG/M2 | OXYGEN SATURATION: 98 % | DIASTOLIC BLOOD PRESSURE: 84 MMHG | SYSTOLIC BLOOD PRESSURE: 122 MMHG | WEIGHT: 135.2 LBS

## 2025-06-24 DIAGNOSIS — C50.212 MALIGNANT NEOPLASM OF UPPER-INNER QUADRANT OF LEFT BREAST IN FEMALE, ESTROGEN RECEPTOR NEGATIVE: Primary | ICD-10-CM

## 2025-06-24 DIAGNOSIS — Z17.1 MALIGNANT NEOPLASM OF UPPER-INNER QUADRANT OF LEFT BREAST IN FEMALE, ESTROGEN RECEPTOR NEGATIVE: Primary | ICD-10-CM

## 2025-06-24 PROCEDURE — 99205 OFFICE O/P NEW HI 60 MIN: CPT | Performed by: SURGERY

## 2025-06-24 PROCEDURE — 1036F TOBACCO NON-USER: CPT | Performed by: SURGERY

## 2025-06-24 PROCEDURE — 99215 OFFICE O/P EST HI 40 MIN: CPT | Performed by: SURGERY

## 2025-06-24 RX ORDER — CEFAZOLIN SODIUM 2 G/100ML
2 INJECTION, SOLUTION INTRAVENOUS ONCE
OUTPATIENT
Start: 2025-06-24 | End: 2025-06-24

## 2025-06-24 RX ORDER — ACETAMINOPHEN 325 MG/1
975 TABLET ORAL ONCE
OUTPATIENT
Start: 2025-06-24 | End: 2025-06-24

## 2025-06-24 RX ORDER — CELECOXIB 400 MG/1
400 CAPSULE ORAL ONCE
OUTPATIENT
Start: 2025-06-24 | End: 2025-06-24

## 2025-06-24 ASSESSMENT — PATIENT HEALTH QUESTIONNAIRE - PHQ9
2. FEELING DOWN, DEPRESSED OR HOPELESS: NOT AT ALL
1. LITTLE INTEREST OR PLEASURE IN DOING THINGS: NOT AT ALL
SUM OF ALL RESPONSES TO PHQ9 QUESTIONS 1 & 2: 0

## 2025-06-24 ASSESSMENT — PAIN SCALES - GENERAL: PAINLEVEL_OUTOF10: 0-NO PAIN

## 2025-06-24 NOTE — PATIENT INSTRUCTIONS
[facility] portfolio provided. Surgical Booklet reviewed and provided.     Your surgery is scheduled for Monday, July 7 at [facility]. The surgery center will contact you the business day before surgery in late afternoon (between 2:00-5:00pm) to confirm arrival time and check-in instructions.  If you haven't received a call by 6:00pm, please call [phone].                                 -Scheduled surgery times may change and you will be notified if this occurs-check your personal voicemail for any updates.     Before surgery, you will need:  - Non-fasting bloodwork (CBC. CMP).  This may be completed at any outpatient  laboratory. You do not need to schedule an appointment to complete. Walk-ins accepted.  - Breast MRI.  Please schedule at exit desk or may call [phone] to arrange.    The Day Before Surgery:  -Do not eat any food after midnight the night before surgery.    -You are permitted to drink 13.5 ounces of clear liquids up to 2 hours before your instructed ARRIVAL time. This includes water, black tea/coffee(no milk, no creamer, no sugar/sweeteners), apple juice or electrolyte drink (Gatorade).   - You may chew gum up to 2 hours before your instructed ARRIVAL time.      On The Morning of Surgery:   -Wear comfortable, loose fitting clothing.  Please try to wear a button down or zip front shirt.  -Do not use moisturizers, creams, lotions or perfume.   -All jewelry and valuables should be left at home.   -Prosthetic devices such as contact lenses, hearing aids, dentures, eyelash extensions, hairpins and body piercing must be removed before surgery.     Bring with You:  -Photo ID and insurance card   -Current list of medicines and allergies   -Pacemaker/Defibrillator/Heart stent cards   -CPAP machine and mask   -Copy of your complete Advanced Directive/DHPOA-if applicable   -Neurostimulator implant remote      Parking and Arrival:  -Check in at the Main Entrance desk and let them know you are  here for surgery.   -You will be directed to the 2nd floor surgical waiting area.

## 2025-06-25 ENCOUNTER — HOSPITAL ENCOUNTER (OUTPATIENT)
Dept: RADIOLOGY | Facility: HOSPITAL | Age: 60
Discharge: HOME | End: 2025-06-25
Payer: COMMERCIAL

## 2025-06-25 DIAGNOSIS — Z17.1 MALIGNANT NEOPLASM OF UPPER-INNER QUADRANT OF LEFT BREAST IN FEMALE, ESTROGEN RECEPTOR NEGATIVE: ICD-10-CM

## 2025-06-25 DIAGNOSIS — C50.212 MALIGNANT NEOPLASM OF UPPER-INNER QUADRANT OF LEFT BREAST IN FEMALE, ESTROGEN RECEPTOR NEGATIVE: ICD-10-CM

## 2025-06-25 PROCEDURE — 2550000001 HC RX 255 CONTRASTS: Mod: JW | Performed by: SURGERY

## 2025-06-25 PROCEDURE — 77049 MRI BREAST C-+ W/CAD BI: CPT

## 2025-06-25 PROCEDURE — A9575 INJ GADOTERATE MEGLUMI 0.1ML: HCPCS | Mod: JW | Performed by: SURGERY

## 2025-06-25 RX ORDER — GADOTERATE MEGLUMINE 376.9 MG/ML
12 INJECTION INTRAVENOUS
Status: COMPLETED | OUTPATIENT
Start: 2025-06-25 | End: 2025-06-25

## 2025-06-25 RX ADMIN — GADOTERATE MEGLUMINE 12 ML: 376.9 INJECTION INTRAVENOUS at 08:05

## 2025-06-30 DIAGNOSIS — Z17.1 MALIGNANT NEOPLASM OF UPPER-INNER QUADRANT OF LEFT BREAST IN FEMALE, ESTROGEN RECEPTOR NEGATIVE: ICD-10-CM

## 2025-06-30 DIAGNOSIS — R92.8 ABNORMAL MAGNETIC RESONANCE IMAGING OF LEFT BREAST: ICD-10-CM

## 2025-06-30 DIAGNOSIS — C50.212 MALIGNANT NEOPLASM OF UPPER-INNER QUADRANT OF LEFT BREAST IN FEMALE, ESTROGEN RECEPTOR NEGATIVE: ICD-10-CM

## 2025-06-30 NOTE — PROGRESS NOTES
History of Present Illness:  Yudith Almeida is a 59 y.o. female with a personal history of skin cancer and triple negative breast cancer, and a family history of skin, lung, and prostate cancer.  Yudith Almeida was referred to the Cancer Genetics Clinic at [facility] by Anita Le MD. Yudith Almeida is interested in genetic testing to clarify her personal risk for cancer, as well as the risks to her family members.     Cancer Medical History:  Personal history of cancer? Yes  Type: Triple negative breast cancer (left breast invasive ductal carcinoma, grade 3, ER <1%, TX <1%, HER2 1+, cT1cN0, stage IB)  Age at diagnosis: 59  Summary: Per Dr. Le -     Abnormal mammogram and targeted ultrasound on 6/6/25   Breast biopsy on 6/16/25    Surgical pathology 6/16/25:  FINAL DIAGNOSIS   A. Left breast, biopsy:    -- Invasive poorly differentiated carcinoma, with extensive necrosis, see note.     Yudith will meet with Dr. Driscoll tomorrow to discuss neoadjuvant chemotherapy, which will be followed by surgery    History of other cancers:   - History of numerous basal and squamous skin cancers, follows regularly with Dr. Mendes    Prior hereditary cancer (germline) genetic testing? No    Prior hereditary cancer (germline) genetic testing in family members? No    Cancer screening history:  Mammograms? See workup above  PAP smear? Yes, most recent 12/2023, negative   Colonoscopy? Yes, most recent at age 50, no polyps. This was her first colonoscopy  Upper endoscopy? No   Dermatology? Yes, most recent 6/2/25 with Edgar Mendes   Other cancer screening? No    Reproductive History:  Number of children: 0  Number of pregnancies: 0  Age first birth: Yes  Breast feeding? N/A  Menarche (age): 16  Menopause (age): 52  OCP: No  HRT: No    Hysterectomy? No  Oophorectomy? No      Family history:  A 4-generation pedigree was obtained and was significant for the following:     - Sister (69) with history of skin  cancer.    -Mother  of lung cancer at 62 years old. She did not have a smoking history but worked in a factory and had exposure to secondhand smoke.  - Maternal uncle  of melanoma in his 40's.   - Maternal uncle  of unknown cancer type in his 40's.  - Maternal grandfather  of potential lung cancer, heavy smoker.    - Father diagnosed with prostate cancer in his 70's.  of old age at 82.    Maternal ancestry is [place].  Paternal ancestry is [place]. There is no known Ashkenazi Mormon ancestry. Consanguinity was denied.       Genetic counseling:    Summary  Ms. Almeida is a 59 y.o. female with a personal history of skin cancer and triple negative breast cancer, and a family history of skin, lung, and prostate cancer. .    Yudith Almeida's reported history is concerning for possible hereditary breast cancer. Ms. Almeida meets current national (NCCN) criteria for testing of high-penetrance breast cancer susceptibility genes, including BRCA1, BRCA2, CDH1, PALB2, PTEN, STK11, and TP53.      Testing is medically necessary, as it could help clarify cancer risks in the family, and may impact recommendations for cancer surveillance and/or the need for risk-reducing options.    Yudith is interested in testing, which is recommended, and was ordered today via the [employer] +RNA Insight panel, which examines the following 77genes:  AIP, ALK, APC, PRETTY, AXIN2, BAP1, BARD1, BMPR1A, BRCA1, BRCA2, BRIP1, CDC73, CDH1, CDK4, CDKN1B, CDKN2A, CEBPA, CHEK2, CTNNA1, DDX41, DICER1, EGFR, EPCAM, ETV6, FH, FLCN, GATA2, GREM1, HOXB13, , KIT, LZTR1, MAX, MBD4, MEN1, MET, MITF, MLH1, MSH2, MSH3, MSH6, MUTYH, NF1, NF2, NTHL1, PALB2, PDGFRA, PHOX2B, PMS2, POLD12, POLE, POT1, GZPQI9S, PTCH1, PTEN, RAD51C, RAD51D, RB1, RET, RPS20, RUNX1, SDHA, SDHAF2, SDHB, SDHC, SDHD, SMAD4, SMARCA4, SMARCB1, SMARCE1, STK11, SUFU, LCAX095, TP53, TSC1, TSC2, WT1, VHL.  . Our discussion is summarized below.    Genetic Counseling  Discussion    We reviewed genes and chromosomes, inherited forms of breast cancer.  We discussed that most cancers are not due to an inherited genetic susceptibility.  However, in about 5-10% of families, there is an inherited genetic mutation that can make a person more susceptible to developing certain forms of cancer.  Within families with a genetic predisposition to cancer, we often see certain patterns, such as multiple family members with cancer and cancers occurring in multiple generations. In addition, earlier onset and/or bilateral cancers are suggestive of an inherited predisposition. There also tends to be a clustering of certain types of related cancer in these families, such as breast and ovarian cancers, or colon and uterine cancers.     We discussed the BRCA1 and BRCA2 genes, which are two genes that have been linked to early-onset breast and/or ovarian cancer, causing HBOC (hereditary breast and ovarian cancer syndrome).   BRCA1/2 mutations have also been associated with triple negative breast cancer, which is the type Yudith Almeida has been diagnosed with. 10-20% of patients with triple negative breast cancer will have a BRCA1/2 mutation (PMID: 30152900). Mutations in these genes are inherited in a dominant pattern and confer up to an 72% lifetime risk for breast cancer.  This is elevated compared to the general population risk of 10-12%.  In addition, BRCA1 and BRCA2 mutation carriers have up to a 58% lifetime risk for ovarian cancer, which is elevated over the 2% general population risk.  Mutation carriers who have already been diagnosed with cancer have an increased risk to develop a second, contralateral breast cancer.  BRCA1/2 gene mutation carriers have an increased risk for male breast cancer, prostate cancer, melanoma, and pancreatic cancer. We discussed potential medical management changes for individuals who carry a BRCA1/2 mutation.    Gene mutations in BRCA1 and BRCA2 are inherited in  an autosomal dominant fashion. This means that if an individual has a change in either of these genes, their siblings and children have a 50% chance of also having that gene change and a 50% chance of not having the gene change.     We discussed that there are multiple genes associated with increased breast cancer risk.  Some genes, like the BRCA1 and BRCA2 genes, are considered highly penetrant breast cancer genes, meaning a mutation in the gene confers a high risk of breast cancer.  Additionally, there are other intermediate (moderate risk) breast cancer genes. For some of the moderate risk genes, there is often limited information regarding the degree to which a mutation in the gene impacts ones risk for different types of cancers.  Additionally, for some of these moderate risk genes, the appropriate management for individuals who have a mutation in one of these genes is not always clear.  Our knowledge about the cancer risks associated with mutations in these moderate risk genes is always growing, and we will likely be able to provide more comprehensive information in the future. In many cases, even if an individual tests positive for a mutation in a moderate risk gene, recommendations are still based on the family history, not the positive test result. Additionally, unexpected findings may occur, where a mutation is identified that confers a risk for a cancer not seen in the family history.  Lastly, in the era of multigene panel testing, we know that more than one pathogenic or likely pathogenic variant can be identified in any one individual and/or family    Yudith Almeida was counseled about hereditary cancer susceptibility including cancer risks, options for increased screening and/or risk reduction, genetic testing, and the implications for other family members.  We discussed different panel options available to MsCatrachito Christopherjonny. After a discussion about the risks, benefits, and limitations of genetic testing,  Yudith Almeida elected to undergo genetic testing for hereditary cancer using the panel described above. Yudith Almeida gave verbal consent to proceed with testing.    We reviewed the types of results we can get back:   - A positive result means that we identified a change in a gene that confers an increased cancer risk for cancer. We will discuss potential changes in management for her and her family based on the specific gene mutation found.    - A negative (normal) result clears her for many cancer predisposition syndromes, but cannot clear her for any/all cancer predisposition risks. She would continue to be screened based on her personal and family history.    - A Variant of Uncertain Significance (VUS) means that some kind of change was found in a gene, but it is currently unknown whether this specific change is harmful.  These results do not  recommendations or warrant familial variant testing.    Ms. Almeida will return to the Cancer Genetics Clinic to discuss her testing results.  At that time, we will make recommendations for both Ms. Almeida and their family members in terms of cancer screening and/or cancer risk reduction options. Ms. Almeida was reminded to follow her primary care providers' recommendations for age-related cancer screenings, such as mammograms, colonoscopies, etc.    We discussed that Yudith Almeida's test results may be released to her chart when they are reported. Most people choose to review the results with their provider at their follow up visit. However, if she chooses to view her results in advance of her visit, the provider may not be available to discuss. Yudith Almeida was advised to keep her follow-up appointment regardless of her results to discuss appropriate management and referrals.      PLAN:  Ms. Almeida elected to undergo genetic testing for hereditary cancer using the [employer] +RNA Insight panel, which examines the following 77genes:  AIP, ALK,  APC, PRETTY, AXIN2, BAP1, BARD1, BMPR1A, BRCA1, BRCA2, BRIP1, CDC73, CDH1, CDK4, CDKN1B, CDKN2A, CEBPA, CHEK2, CTNNA1, DDX41, DICER1, EGFR, EPCAM, ETV6, FH, FLCN, GATA2, GREM1, HOXB13, , KIT, LZTR1, MAX, MBD4, MEN1, MET, MITF, MLH1, MSH2, MSH3, MSH6, MUTYH, NF1, NF2, NTHL1, PALB2, PDGFRA, PHOX2B, PMS2, POLD12, POLE, POT1, VEYDZ1S, PTCH1, PTEN, RAD51C, RAD51D, RB1, RET, RPS20, RUNX1, SDHA, SDHAF2, SDHB, SDHC, SDHD, SMAD4, SMARCA4, SMARCB1, SMARCE1, STK11, SUFU, VBQT352, TP53, TSC1, TSC2, WT1, VHL.   panel.  Yudith Almeida gave their verbal consent to proceed with testing.    An Printed Piece DNA/RNA blood test kit will be sent to Ms. Almeida's home. Yudith Almeida will then bring the test kit to a  outpatient blood draw lab to have her blood drawn for testing (using the test tubes provided in the kit). The sample will then be sent to [facility] for analysis.     Results are typically available within 3-4 weeks from the time of sample reception. Ms. Almeida will return to the Cancer Genetics Clinic discuss her test results.  Our  will call Yudith Almeida when results return to make a follow up appointment.  We remain available to Ms. Almeida at [phone] if any questions arise regarding information discussed at today's visit.    Mary Jane Rubi MS, GC  Licensed Genetic Counselor  [facility]  Phone: [phone]     Total time spent on day of encounter: 43 minutes (21 minutes with patient, 22 minutes on pre/post patient care activities, including documentation).    An virtual (video and audio) between the patient (at the originating site) and provider (at the distant site) was utilized to provide this telehealth service. Verbal consent was requested and obtained from Yudith Almeida on this date, July 7, 2025 for a telehealth visit.  The patient stated they were located in [place] at the time of visit.

## 2025-07-02 ENCOUNTER — APPOINTMENT (OUTPATIENT)
Facility: CLINIC | Age: 60
End: 2025-07-02
Payer: COMMERCIAL

## 2025-07-02 ENCOUNTER — TELEPHONE (OUTPATIENT)
Dept: ORTHOPEDIC SURGERY | Facility: CLINIC | Age: 60
End: 2025-07-02

## 2025-07-02 VITALS
BODY MASS INDEX: 22.2 KG/M2 | SYSTOLIC BLOOD PRESSURE: 136 MMHG | HEIGHT: 64 IN | WEIGHT: 130 LBS | DIASTOLIC BLOOD PRESSURE: 80 MMHG

## 2025-07-02 DIAGNOSIS — Z01.419 ENCOUNTER FOR ANNUAL ROUTINE GYNECOLOGICAL EXAMINATION: Primary | ICD-10-CM

## 2025-07-02 PROCEDURE — 1036F TOBACCO NON-USER: CPT | Performed by: OBSTETRICS & GYNECOLOGY

## 2025-07-02 PROCEDURE — 99396 PREV VISIT EST AGE 40-64: CPT | Performed by: OBSTETRICS & GYNECOLOGY

## 2025-07-02 PROCEDURE — 3008F BODY MASS INDEX DOCD: CPT | Performed by: OBSTETRICS & GYNECOLOGY

## 2025-07-02 ASSESSMENT — ENCOUNTER SYMPTOMS
FATIGUE: 0
CONSTIPATION: 0
ABDOMINAL DISTENTION: 0
UNEXPECTED WEIGHT CHANGE: 0
COLOR CHANGE: 0
SLEEP DISTURBANCE: 0
CHILLS: 0
VOMITING: 0
DYSURIA: 0
DIARRHEA: 0
BACK PAIN: 0
FEVER: 0
BLOOD IN STOOL: 0
FREQUENCY: 0
FLANK PAIN: 0
ABDOMINAL PAIN: 0
HEMATURIA: 0
SHORTNESS OF BREATH: 0
APPETITE CHANGE: 0
NAUSEA: 0

## 2025-07-02 NOTE — TELEPHONE ENCOUNTER
Called and spoke with pt. She is aware of results and has apts with surgeon, heme onc, and genetics for follow up.

## 2025-07-02 NOTE — PROGRESS NOTES
"History Of Present Illness  Routine Gyn Exam  Yudith Almeida here for routine WWE.  Pt is postmenopausal.  Denies spotting or bleeding.     Concerns: recently diagnosed with left breast invasive ductal carcinoma.     Medical and surgical histories reviewed with patient.   Exercise: jogging/walking, hiking, weights.     Gynecologic History  Postmenopausal.  Sexually active: not currently .  Last Pap: .   Last mammogram: .   Last Colonoscopy:  at 49 yo per patient .       Obstetric History  OB History    Para Term  AB Living   0 0 0 0 0 0   SAB IAB Ectopic Multiple Live Births   0 0 0 0 0        Review of Systems   Constitutional:  Negative for appetite change, chills, fatigue, fever and unexpected weight change.   Respiratory:  Negative for shortness of breath.    Cardiovascular:  Negative for chest pain.   Gastrointestinal:  Negative for abdominal distention, abdominal pain, blood in stool, constipation, diarrhea, nausea and vomiting.   Endocrine: Negative for cold intolerance and heat intolerance.   Genitourinary:  Negative for dyspareunia, dysuria, flank pain, frequency, genital sores, hematuria, menstrual problem, pelvic pain, urgency, vaginal bleeding, vaginal discharge and vaginal pain.   Musculoskeletal:  Negative for back pain.   Skin:  Negative for color change.   Psychiatric/Behavioral:  Negative for sleep disturbance.        /80   Ht 1.626 m (5' 4\")   Wt 59 kg (130 lb)   BMI 22.31 kg/m²      Physical Exam  Constitutional:       Appearance: Normal appearance.   HENT:      Head: Normocephalic and atraumatic.   Chest:   Breasts:     Right: Normal.      Left: Normal.   Abdominal:      General: Abdomen is flat.      Palpations: Abdomen is soft.      Tenderness: There is no abdominal tenderness.   Genitourinary:     General: Normal vulva.      Vagina: Normal.      Cervix: Normal.      Uterus: Normal.       Adnexa: Right adnexa normal and left adnexa normal.   Skin:     General: " Skin is warm and dry.   Neurological:      Mental Status: She is alert and oriented to person, place, and time.   Psychiatric:         Mood and Affect: Mood normal.              Assessment/Plan         Routine Well Woman Exam Today  Discussed diet and exercise.   Reviewed routine health screenings.   Pap: 2023 wnl     Pt has follow up scheduled with all appropriate teams for breast cancer treatment.   Reassurance provided and pt advised she can reach out any time if I can provide assistance.                  Gin Bello MD

## 2025-07-03 ENCOUNTER — HOSPITAL ENCOUNTER (OUTPATIENT)
Dept: RADIOLOGY | Facility: HOSPITAL | Age: 60
Discharge: HOME | End: 2025-07-03
Payer: COMMERCIAL

## 2025-07-03 ENCOUNTER — PATIENT OUTREACH (OUTPATIENT)
Dept: HEMATOLOGY/ONCOLOGY | Facility: HOSPITAL | Age: 60
End: 2025-07-03
Payer: COMMERCIAL

## 2025-07-03 DIAGNOSIS — C50.919 MALIGNANT NEOPLASM OF UNSPECIFIED SITE OF UNSPECIFIED FEMALE BREAST: ICD-10-CM

## 2025-07-03 DIAGNOSIS — Z17.1 MALIGNANT NEOPLASM OF UPPER-INNER QUADRANT OF LEFT BREAST IN FEMALE, ESTROGEN RECEPTOR NEGATIVE: ICD-10-CM

## 2025-07-03 DIAGNOSIS — C50.212 MALIGNANT NEOPLASM OF UPPER-INNER QUADRANT OF LEFT BREAST IN FEMALE, ESTROGEN RECEPTOR NEGATIVE: ICD-10-CM

## 2025-07-03 DIAGNOSIS — R92.8 ABNORMAL MAGNETIC RESONANCE IMAGING OF LEFT BREAST: ICD-10-CM

## 2025-07-03 PROCEDURE — 76942 ECHO GUIDE FOR BIOPSY: CPT

## 2025-07-03 PROCEDURE — 2720000007 HC OR 272 NO HCPCS

## 2025-07-03 PROCEDURE — C1739 HC OR 272 NO HCPCS: HCPCS

## 2025-07-03 PROCEDURE — 2500000004 HC RX 250 GENERAL PHARMACY W/ HCPCS (ALT 636 FOR OP/ED): Performed by: RADIOLOGY

## 2025-07-03 PROCEDURE — C1819 TISSUE LOCALIZATION-EXCISION: HCPCS

## 2025-07-03 PROCEDURE — 76642 ULTRASOUND BREAST LIMITED: CPT | Mod: LT

## 2025-07-03 PROCEDURE — 2780000003 HC OR 278 NO HCPCS

## 2025-07-03 RX ADMIN — Medication 8 ML: at 09:45

## 2025-07-03 SDOH — ECONOMIC STABILITY: FOOD INSECURITY: WITHIN THE PAST 12 MONTHS, YOU WORRIED THAT YOUR FOOD WOULD RUN OUT BEFORE YOU GOT MONEY TO BUY MORE.: NEVER TRUE

## 2025-07-03 SDOH — HEALTH STABILITY: PHYSICAL HEALTH: ON AVERAGE, HOW MANY MINUTES DO YOU ENGAGE IN EXERCISE AT THIS LEVEL?: 30 MIN

## 2025-07-03 SDOH — ECONOMIC STABILITY: GENERAL
WHICH OF THE FOLLOWING DO YOU KNOW HOW TO USE AND HAVE ACCESS TO EVERY DAY? (CHOOSE ALL THAT APPLY): SMARTPHONE WITH CELLULAR DATA PLAN;DESKTOP COMPUTER, LAPTOP COMPUTER, OR TABLET WITH BROADBAND INTERNET CONNECTION

## 2025-07-03 SDOH — HEALTH STABILITY: PHYSICAL HEALTH: ON AVERAGE, HOW MANY DAYS PER WEEK DO YOU ENGAGE IN MODERATE TO STRENUOUS EXERCISE (LIKE A BRISK WALK)?: 5 DAYS

## 2025-07-03 SDOH — ECONOMIC STABILITY: INCOME INSECURITY: IN THE LAST 12 MONTHS, WAS THERE A TIME WHEN YOU WERE NOT ABLE TO PAY THE MORTGAGE OR RENT ON TIME?: NO

## 2025-07-03 SDOH — ECONOMIC STABILITY: FOOD INSECURITY: WITHIN THE PAST 12 MONTHS, THE FOOD YOU BOUGHT JUST DIDN'T LAST AND YOU DIDN'T HAVE MONEY TO GET MORE.: NEVER TRUE

## 2025-07-03 SDOH — ECONOMIC STABILITY: TRANSPORTATION INSECURITY
IN THE PAST 12 MONTHS, HAS LACK OF TRANSPORTATION KEPT YOU FROM MEETINGS, WORK, OR FROM GETTING THINGS NEEDED FOR DAILY LIVING?: NO

## 2025-07-03 SDOH — ECONOMIC STABILITY: TRANSPORTATION INSECURITY
IN THE PAST 12 MONTHS, HAS THE LACK OF TRANSPORTATION KEPT YOU FROM MEDICAL APPOINTMENTS OR FROM GETTING MEDICATIONS?: NO

## 2025-07-03 SDOH — ECONOMIC STABILITY: GENERAL
WHICH OF THE FOLLOWING WOULD YOU LIKE TO GET CONNECTED TO IN ORDER TO RECEIVE A DISCOUNT OR FOR FREE? (CHOOSE ALL THAT APPLY): PATIENT UNABLE TO ANSWER

## 2025-07-03 ASSESSMENT — SOCIAL DETERMINANTS OF HEALTH (SDOH)
HOW OFTEN DO YOU ATTENT MEETINGS OF THE CLUB OR ORGANIZATION YOU BELONG TO?: NEVER
IN THE PAST 12 MONTHS, HAS THE ELECTRIC, GAS, OIL, OR WATER COMPANY THREATENED TO SHUT OFF SERVICE IN YOUR HOME?: NO
WITHIN THE LAST YEAR, HAVE TO BEEN RAPED OR FORCED TO HAVE ANY KIND OF SEXUAL ACTIVITY BY YOUR PARTNER OR EX-PARTNER?: NO
WITHIN THE LAST YEAR, HAVE YOU BEEN HUMILIATED OR EMOTIONALLY ABUSED IN OTHER WAYS BY YOUR PARTNER OR EX-PARTNER?: NO
WITHIN THE LAST YEAR, HAVE YOU BEEN AFRAID OF YOUR PARTNER OR EX-PARTNER?: NO
WITHIN THE LAST YEAR, HAVE YOU BEEN KICKED, HIT, SLAPPED, OR OTHERWISE PHYSICALLY HURT BY YOUR PARTNER OR EX-PARTNER?: NO
IN A TYPICAL WEEK, HOW MANY TIMES DO YOU TALK ON THE PHONE WITH FAMILY, FRIENDS, OR NEIGHBORS?: MORE THAN THREE TIMES A WEEK
DO YOU BELONG TO ANY CLUBS OR ORGANIZATIONS SUCH AS CHURCH GROUPS UNIONS, FRATERNAL OR ATHLETIC GROUPS, OR SCHOOL GROUPS?: NO
HOW OFTEN DO YOU GET TOGETHER WITH FRIENDS OR RELATIVES?: ONCE A WEEK
HOW OFTEN DO YOU ATTEND CHURCH OR RELIGIOUS SERVICES?: NEVER
HOW HARD IS IT FOR YOU TO PAY FOR THE VERY BASICS LIKE FOOD, HOUSING, MEDICAL CARE, AND HEATING?: NOT HARD AT ALL

## 2025-07-03 ASSESSMENT — PAIN SCALES - GENERAL
PAINLEVEL_OUTOF10: 0 - NO PAIN
PAINLEVEL_OUTOF10: 1
PAINLEVEL_OUTOF10: 0 - NO PAIN
PAINLEVEL_OUTOF10: 0 - NO PAIN

## 2025-07-03 ASSESSMENT — PAIN - FUNCTIONAL ASSESSMENT: PAIN_FUNCTIONAL_ASSESSMENT: 0-10

## 2025-07-03 ASSESSMENT — LIFESTYLE VARIABLES
HOW OFTEN DO YOU HAVE SIX OR MORE DRINKS ON ONE OCCASION: NEVER
HOW MANY STANDARD DRINKS CONTAINING ALCOHOL DO YOU HAVE ON A TYPICAL DAY: PATIENT DOES NOT DRINK
HOW OFTEN DO YOU HAVE A DRINK CONTAINING ALCOHOL: NEVER
SKIP TO QUESTIONS 9-10: 1
AUDIT-C TOTAL SCORE: 0

## 2025-07-07 ENCOUNTER — APPOINTMENT (OUTPATIENT)
Facility: CLINIC | Age: 60
End: 2025-07-07
Payer: COMMERCIAL

## 2025-07-07 DIAGNOSIS — Z80.1 FAMILY HISTORY OF LUNG CANCER: ICD-10-CM

## 2025-07-07 DIAGNOSIS — Z85.828 PERSONAL HISTORY OF SKIN CANCER: ICD-10-CM

## 2025-07-07 DIAGNOSIS — C50.212 MALIGNANT NEOPLASM OF UPPER-INNER QUADRANT OF LEFT BREAST IN FEMALE, ESTROGEN RECEPTOR NEGATIVE: ICD-10-CM

## 2025-07-07 DIAGNOSIS — Z80.8 FAMILY HISTORY OF NONMELANOMA SKIN CANCER: ICD-10-CM

## 2025-07-07 DIAGNOSIS — Z17.1 MALIGNANT NEOPLASM OF UPPER-INNER QUADRANT OF LEFT BREAST IN FEMALE, ESTROGEN RECEPTOR NEGATIVE: ICD-10-CM

## 2025-07-07 DIAGNOSIS — Z80.42 FAMILY HISTORY OF PROSTATE CANCER: ICD-10-CM

## 2025-07-07 DIAGNOSIS — Z80.8 FAMILY HISTORY OF MELANOMA: ICD-10-CM

## 2025-07-07 PROCEDURE — 96041 GENETIC COUNSELING SVC EA 30: CPT

## 2025-07-08 ENCOUNTER — HOSPITAL ENCOUNTER (OUTPATIENT)
Dept: RADIOLOGY | Facility: CLINIC | Age: 60
Discharge: HOME | End: 2025-07-08
Payer: COMMERCIAL

## 2025-07-08 ENCOUNTER — OFFICE VISIT (OUTPATIENT)
Dept: HEMATOLOGY/ONCOLOGY | Facility: HOSPITAL | Age: 60
End: 2025-07-08
Payer: COMMERCIAL

## 2025-07-08 VITALS
HEART RATE: 74 BPM | HEIGHT: 64 IN | BODY MASS INDEX: 22.13 KG/M2 | SYSTOLIC BLOOD PRESSURE: 119 MMHG | WEIGHT: 129.63 LBS | RESPIRATION RATE: 16 BRPM | OXYGEN SATURATION: 98 % | TEMPERATURE: 97.3 F | DIASTOLIC BLOOD PRESSURE: 81 MMHG

## 2025-07-08 DIAGNOSIS — Z17.1 MALIGNANT NEOPLASM OF UPPER-INNER QUADRANT OF LEFT BREAST IN FEMALE, ESTROGEN RECEPTOR NEGATIVE: ICD-10-CM

## 2025-07-08 DIAGNOSIS — C50.212 MALIGNANT NEOPLASM OF UPPER-INNER QUADRANT OF LEFT BREAST IN FEMALE, ESTROGEN RECEPTOR NEGATIVE: Primary | ICD-10-CM

## 2025-07-08 DIAGNOSIS — Z17.1 MALIGNANT NEOPLASM OF UPPER-INNER QUADRANT OF LEFT BREAST IN FEMALE, ESTROGEN RECEPTOR NEGATIVE: Primary | ICD-10-CM

## 2025-07-08 DIAGNOSIS — C50.919 BREAST CANCER: ICD-10-CM

## 2025-07-08 DIAGNOSIS — C50.212 MALIGNANT NEOPLASM OF UPPER-INNER QUADRANT OF LEFT BREAST IN FEMALE, ESTROGEN RECEPTOR NEGATIVE: ICD-10-CM

## 2025-07-08 DIAGNOSIS — R92.8 ABNORMAL MAGNETIC RESONANCE IMAGING OF LEFT BREAST: ICD-10-CM

## 2025-07-08 PROCEDURE — 19085 BX BREAST 1ST LESION MR IMAG: CPT | Performed by: RADIOLOGY

## 2025-07-08 PROCEDURE — 2720000007 HC OR 272 NO HCPCS

## 2025-07-08 PROCEDURE — A9575 INJ GADOTERATE MEGLUMI 0.1ML: HCPCS | Performed by: SURGERY

## 2025-07-08 PROCEDURE — 3008F BODY MASS INDEX DOCD: CPT | Performed by: INTERNAL MEDICINE

## 2025-07-08 PROCEDURE — 99205 OFFICE O/P NEW HI 60 MIN: CPT | Performed by: INTERNAL MEDICINE

## 2025-07-08 PROCEDURE — 2500000004 HC RX 250 GENERAL PHARMACY W/ HCPCS (ALT 636 FOR OP/ED): Performed by: RADIOLOGY

## 2025-07-08 PROCEDURE — 19085 BX BREAST 1ST LESION MR IMAG: CPT

## 2025-07-08 PROCEDURE — 2550000001 HC RX 255 CONTRASTS: Performed by: SURGERY

## 2025-07-08 PROCEDURE — 99215 OFFICE O/P EST HI 40 MIN: CPT | Performed by: INTERNAL MEDICINE

## 2025-07-08 RX ORDER — LIDOCAINE HYDROCHLORIDE 10 MG/ML
10 INJECTION, SOLUTION EPIDURAL; INFILTRATION; INTRACAUDAL; PERINEURAL ONCE
Status: COMPLETED | OUTPATIENT
Start: 2025-07-08 | End: 2025-07-08

## 2025-07-08 RX ORDER — BISMUTH SUBSALICYLATE 262 MG
1 TABLET,CHEWABLE ORAL DAILY
COMMUNITY

## 2025-07-08 RX ORDER — GADOTERATE MEGLUMINE 376.9 MG/ML
12 INJECTION INTRAVENOUS
Status: COMPLETED | OUTPATIENT
Start: 2025-07-08 | End: 2025-07-08

## 2025-07-08 RX ADMIN — GADOTERATE MEGLUMINE 12 ML: 376.9 INJECTION INTRAVENOUS at 13:48

## 2025-07-08 RX ADMIN — LIDOCAINE HYDROCHLORIDE 200 MG: 10 INJECTION, SOLUTION INFILTRATION; PERINEURAL at 13:31

## 2025-07-08 ASSESSMENT — PAIN SCALES - GENERAL
PAINLEVEL_OUTOF10: 6
PAINLEVEL_OUTOF10: 0-NO PAIN
PAINLEVEL_OUTOF10: 0 - NO PAIN
PAINLEVEL_OUTOF10: 0 - NO PAIN

## 2025-07-08 ASSESSMENT — PAIN - FUNCTIONAL ASSESSMENT: PAIN_FUNCTIONAL_ASSESSMENT: 0-10

## 2025-07-08 NOTE — DISCHARGE INSTRUCTIONS
1255:Procedural steps explained and patient given opportunity to verbalize concerns and seek clarification.  Post procedure self-care and potential for bruising , hematoma, and pain reviewed.  Patient verbalizes understanding.      1255:Patient offered aromatherapy, warm blankets and music.   Guided imagery, touch and relaxation breathing to be used throughout the procedure.        1410:AFTER THE TEST  A steri-strip and bandage will be placed over the incision. You may shower after 24 hours. Remove bandage after 24 hours. Remove bandage after the shower. Leave the steri-strips in place to fall off on their own. If after 1 week the steri-strips are still on, you may remove them. Avoid swimming or soaking in tub for 3 days.     You may have mild discomfort at the test site. If needed, you may take Tylenol (Acetaminophen) for pain. Please avoid taking NSAIDs, Motrin, Advil, Aleve, or ibuprofen for 24 hours following the biopsy. After 24 hours you may resume NSAIDSs.     If you take aspirin, Plavix, Coumadin, Xarelto or Eliquis please tell us. If these medications were stopped by your provider, please ask them when to resume.     You may have some tenderness, bruising or slight bleeding at the site. Please apply ice packs to the site for 15 minutes on and 15 minutes off for a 2 hour minimum.     Most people can return to their usual routine after the procedure. Avoid Strenuous activity for 24 hours.     Sleep in a bra the night after your biopsy. Continue to do so for comfort.     Call your provider if you have any of the following symptoms :  Fever  Increased pain  Increased bleeding  Redness  Increased swelling  Yellowish drainage  Your provider will get the biopsy results within 5 - 7 days. Call your provider with any questions.     Patient education brochure and pain/comfort measures have been reviewed.   Phone number provided to contact [facility] if problems arise.     Patient verbalized understanding of home  going instructions.      1415:  Patient discharged ambulatory.

## 2025-07-08 NOTE — PROGRESS NOTES
Patient Visit Information:   Date of Service: 7/8/2025   Referring Provider: Dr. Anita Le  Visit Type: New Visit      Cancer History:          Breast         AJCC Edition: 8th (AJCC), Diagnosis Date: 6/16/2025     Treatment Synopsis:    59 y.o. 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  The patient's breast cancer was diagnosed on June 16, 2025, and is grade 3, Triple Negative, estrogen receptor <1%, progesterone receptor <1% and HER-2/cinthia negative.       ONCOLOGIC HISTORY:   Cancer Staging   Malignant neoplasm of upper-inner quadrant of left breast in female, estrogen receptor negative  Staging form: Breast, AJCC 8th Edition  - Clinical stage from 6/16/2025: Stage IIB - Signed by Madiha Driscoll MD on 7/9/2025  cT2  cN0  cM0  Damon grade: G3  ER Status: Negative  PA Status: Negative  HER2 Status: Negative     Details of her history are as follows:    5/31/25: Patient underwent bilateral screening mammogram which demonstrated extremely dense breast tissue, BI-RADS Category 0, A focal asymmetry with associated calcifications is seen in central left breast posteriorly.  No suspicious masses or calcifications were identified in the right breast.  6/6/25:  Patient underwent a  left diagnostic mammogram and targeted ultrasound, BI-RADS Category 4, This revealed that previously seen focal area of mass asymmetry with associated calcifications within the upper inner quadrant of the left breast persisted. US revealed a mass at 11:00, 9 cm from the nipple measuring approximately 17 mm. No suspicious lymph nodes were seen.  6/16/25: Patient underwent an US-guided core needle biopsy of the left breast, 11:00 9cm from nipple. Pathology was consistent with above  6/25/2025: Patient underwent an MRI of the breast. This revealed an irregular rim-enhancing mass in the        superomedial breast at  posterior depth corresponding to the site of biopsy proven malignancy.  It measured        2.3 x 1.9 x 2.1 cm and non mass enhancement along the superior and inferior medial aspect of the mass.  In        total the irregular mass and non mass enhancement measured up to 5.0 x 2.5 cm. There is a prominent level 1         axillary lymph node with cortical thickness of 0.4 cm. There is an indeterminate internal mammary lymph node        measuring 0.5 cm   07/03/2025: Patient underwent an US guided core biopsy of left axilla LN. Path results are pending  07/08/2025: Patient underwent a MRI guided biopsy of non-mass enhancement. Path results are pending       History of Present Illness: Patient ID: Yudith Almeida is a 59 y.o. female.        Chief Complaint: Here for neoadjuvant treatment consideration     Interval History:    Ms. Almeida is a pleasant 59 y.o. 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  The patient's breast cancer was diagnosed on June 16, 2025, and is grade 3, Triple Negative, estrogen receptor <1%, progesterone receptor <1% and HER-2/citnhia negative.    She comes in accompanied by her  for neoadjuvant treatment consideration.     Details of her history are as follows:    Back on 5/31/25, patient underwent bilateral screening mammogram which demonstrated extremely dense breast tissue, BI-RADS Category 0, A focal asymmetry with associated calcifications is seen in central left breast posteriorly.  No suspicious masses or calcifications were identified in the right breast.    Patient underwent a  left diagnostic mammogram and targeted ultrasound on 06/06/2025, BI-RADS Category 4, This revealed that previously seen focal area of mass asymmetry with associated calcifications within the upper inner quadrant of the left breast persisted. US revealed a mass at 11:00, 9 cm from the nipple  measuring approximately 17 mm. No suspicious lymph nodes were seen.    Further follow-up with an US-guided core needle biopsy of the left breast, at 11:00 9cm from nipple was completed on 06/16/2025. Pathology was consistent with invasive poorly differentiated carcinoma with features as above.    On 6/25/2025, patient underwent an MRI of the breast. This revealed an irregular rim-enhancing mass in the superomedial breast at posterior depth corresponding to the site of biopsy proven malignancy.  It measured 2.3 x 1.9 x 2.1 cm and non mass enhancement along the superior and inferior medial aspect of the mass.  In total the irregular mass and non mass enhancement measured up to 5.0 x 2.5 cm. There is a prominent level 1 axillary lymph node with cortical thickness of 0.4 cm. There is an indeterminate internal mammary lymph node measuring 0.5 cm.      Patient has since undergone an US guided core biopsy of left axilla LN. Path results are pending    She is pending an MRI guided biopsy of non-mass enhancement this afternoon.    She has no complaints today.         Review of Systems:     A 14-point review of system was completed and was negative except for what is noted in HPI.        Allergies and Intolerances:       Allergies: Allergies[1]              Outpatient Medication Profile: Current Medications[2]           Medical History:  Medical History[3]   Surgical History: Surgical History[4]           Family History: Family History[5]  Family Oncology History:  Cancer-related family history includes Cancer (age of onset: 60 - 69) in her mother; Lung cancer in her mother; Prostate cancer in her father.   Social Substance History:   Social History     Socioeconomic History    Marital status:      Spouse name: Not on file    Number of children: Not on file    Years of education: Not on file    Highest education level: Not on file   Occupational History    Not on file   Tobacco Use    Smoking status: Never    Smokeless  tobacco: Never   Substance and Sexual Activity    Alcohol use: Not Currently     Alcohol/week: 6.0 standard drinks of alcohol     Types: 2 Glasses of wine, 4 Cans of beer per week     Comment: No drinks after i felt lump    Drug use: Never    Sexual activity: Not Currently     Partners: Male     Birth control/protection: Abstinence, Post-menopausal   Other Topics Concern    Not on file   Social History Narrative    Not on file     Social Drivers of Health     Financial Resource Strain: Low Risk  (7/3/2025)    Overall Financial Resource Strain (CARDIA)     Difficulty of Paying Living Expenses: Not hard at all   Food Insecurity: No Food Insecurity (7/3/2025)    Hunger Vital Sign     Worried About Running Out of Food in the Last Year: Never true     Ran Out of Food in the Last Year: Never true   Transportation Needs: No Transportation Needs (7/3/2025)    PRAPARE - Transportation     Lack of Transportation (Medical): No     Lack of Transportation (Non-Medical): No   Physical Activity: Sufficiently Active (7/3/2025)    Exercise Vital Sign     Days of Exercise per Week: 5 days     Minutes of Exercise per Session: 30 min   Stress: No Stress Concern Present (7/3/2025)    [employer] - Occupational Stress Questionnaire     Feeling of Stress : Not at all   Social Connections: Moderately Isolated (7/3/2025)    Social Connection and Isolation Panel [NHANES]     Frequency of Communication with Friends and Family: More than three times a week     Frequency of Social Gatherings with Friends and Family: Once a week     Attends Denominational Services: Never     Active Member of Clubs or Organizations: No     Attends Club or Organization Meetings: Never     Marital Status:    Intimate Partner Violence: Not At Risk (7/3/2025)    Humiliation, Afraid, Rape, and Kick questionnaire     Fear of Current or Ex-Partner: No     Emotionally Abused: No     Physically Abused: No     Sexually Abused: No   Housing  "Stability: Low Risk  (7/3/2025)    Housing Stability Vital Sign     Unable to Pay for Housing in the Last Year: No     Number of Times Moved in the Last Year: 0     Homeless in the Last Year: No      Additional Social History:     Breast Cancer Risk Factors:   REPRODUCTIVE HEALTH: menarche at 16, post-menopausal at 55, , with no hormone exposure such as birth control pills or post menopausal estrogen    OBJECTIVE:    VS / Pain:  /81   Pulse 74   Temp 36.3 °C (97.3 °F) (Temporal)   Resp 16   Ht (S) 1.626 m (5' 4.02\")   Wt 58.8 kg (129 lb 10.1 oz)   SpO2 98%   BMI 22.24 kg/m²   BSA: 1.63 meters squared   Pain Scale: 0    The ECOG performance scale today is Asymptomatic  Physical Exam:      Constitutional: Well developed, awake/alert/oriented  x3, no distress, alert and cooperative   Eyes: PERRL, EOMI, clear sclera   ENMT: mucous membranes moist, no apparent injury,  no lesions seen   Head/Neck: Neck supple, no apparent injury, thyroid  without mass or tenderness, No JVD, trachea midline, no bruits   Respiratory/Thorax: Patent airways, CTAB, normal  breath sounds with good chest expansion, thorax symmetric   Cardiovascular: Regular, rate and rhythm, no murmurs,  2+ equal pulses of the extremities, normal S 1and S 2   Gastrointestinal: Nondistended, soft, non-tender,  no rebound tenderness or guarding, no masses palpable, no organomegaly, +BS, no bruits   Musculoskeletal: ROM intact, no joint swelling, normal  strength   Extremities: Normal. No abnormalities detected   Neurological: alert and oriented x3, intact senses,  motor, response and reflexes, normal strength   Breast: Breast exam: Palpable mass in the at UIQ of left breast measuring 2-3 cm. No palpable mass in right breast. No palpable axillary or supraclavicular lymphadenopathies bilaterally. No swelling of either upper extremity which had free range of motion.     Lymphatic: No significant lymphadenopathy except as noted above "   Psychological: Appropriate mood and behavior   Skin: Warm and dry, no lesions, no rashes         DIAGNOSTICRESULTSBEGIN@  MR breast bilateral w contrast full protocol  Status: Final result     PACS Images     Show images for MR breast bilateral w contrast full protocol  Signed by    Signed Time Phone Pager   Claire Pritchett MD 6/26/2025 13:02 [phone]     Exam Information    Status Exam Begun Exam Ended   Final 6/25/2025 07:49 6/25/2025 08:19     Study Result    Narrative & Impression   Interpreted By:  Claire Pritchett,   STUDY:  BI MR BREAST BILATERAL WITH CONTRAST FULL PROTOCOL;  6/25/2025 8:19 am      ACCESSION NUMBER(S):  [id number]      ORDERING CLINICIAN:  MICHELLE ZHENG      INDICATION:  Newly diagnosed left breast cancer      ,C50.212 Malignant neoplasm of upper-inner quadrant of left female  breast,Z17.1 Estrogen receptor negative status (ER-)      COMPARISON:  Mammogram and ultrasound 06/06/2025      TECHNIQUE:  Using a dedicated breast coil, STIR axial and T1-weighted fat  saturation axial images of the breasts were obtained, the latter both  before and after intravenous administration of Gadolinium DTPA. On an  independent workstation, 3-D images were formulated using [employer]  including time enhancement curves, subtraction images and MIP images.      Intravenous contrast: 12 ML of Dotarem      FINDINGS:  Density: Extreme fibroglandular tissue.      There is symmetric minimal bilateral background enhancement.      RIGHT BREAST:  No suspicious mass or nonmass enhancement is  identified.      No axillary or internal mammary lymphadenopathy is appreciated.      LEFT BREAST: There is an irregular rim-enhancing mass in the  superomedial breast at posterior depth corresponding to the site of  biopsy proven malignancy (subtraction image 66/166). It measures 2.3  x 1.9 x 2.1 cm (trans by AP by CC). Signal void from the there is  clumped non mass enhancement along the superior and inferior  medial  aspect of the mass (subtraction image 58-83/166). In total the  irregular mass and non mass enhancement measure up to 5.0 x 2.5 cm  (CC x AP). This is appreciated on a saved sagittal image. There is no  evidence of extension to scan or underlying pectoralis muscle of      There is a prominent level 1 axillary lymph node with cortical  thickness of 0.4 cm (series 7 image 40/166). There is an  indeterminate internal mammary lymph node measuring 0.5 cm (series 7,  image 46/166).      NON-BREAST FINDINGS:  None.      IMPRESSION:  Biopsy proven malignant left breast mass with adjacent clumped non  mass enhancement, in total spanning up to 5.0 cm. MRI guided biopsy  of the most anterior inferior aspect of the non-mass enhancement is  recommended if this would alter surgical management.      Left axillary lymph node with prominent cortical thickness, as well  as an indeterminate left internal mammary lymph node. MRI-directed  ultrasound of both finding is recommended. Biopsy may be performed if  indicated.      No MRI evidence of malignancy in the right breast.      BI-RADS CATEGORY:  BI-RADS Category:  4 Suspicious.  Recommendation:  Surgical Consultation and Biopsy.  Recommended Date:  Immediate.  Laterality:  Left.      For any future breast imaging appointments, please call [phone]).          MACRO:  Critical Finding:  See findings. Notification was initiated on  6/26/2025 at 1:01 pm by Dr. Claire Pritchett.  (**-YCF-**)  Instructions:  Surgical Consultation and Imaging Guided Biopsy.      Signed by: Claire Pritchett 6/26/2025 1:02 PM  Dictation workstation:   [id number]       No images are attached to the encounter.    @LABORATORY DATA@  Lab Results   Component Value Date    WBC 4.1 (L) 07/26/2024    HGB 14.4 07/26/2024    HCT 44.4 07/26/2024     07/26/2024    CHOL 217 (H) 07/26/2024    TRIG 163 (H) 07/26/2024    HDL 52.0 07/26/2024    ALT 15 07/26/2024    AST 17 07/26/2024      07/26/2024    K 4.9 07/26/2024     07/26/2024    CREATININE 0.95 07/26/2024    BUN 17 07/26/2024    CO2 28 07/26/2024    TSH 1.92 09/15/2021    INR 1.1 01/04/2021         Assessment/Plan           Assessment and Plan:   Assessment  Ms. Almeida is a pleasant 59 y.o. 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  The patient's breast cancer was diagnosed on June 16, 2025, and is grade 3, Triple Negative, estrogen receptor <1%, progesterone receptor <1% and HER-2/cinthia negative.    She comes in accompanied by her  for neoadjuvant treatment consideration.     Given that tumor size is at least 2.3 cm and may actually be greater than 5 cm she will benefit fro neoadjuvant chemotherapy with Taxol, Carboplatin, Adriamycin and Cyclophosphamide given concurrently with Pembrolizumab (KEYNOTE 522).     Patient has an appointment this afternoon for an MRI guided biopsy and needed to leave. She will return to the clinic to finalize this treatment plan and to obtain consent with a plan to start treatment on 7/23/2025.    Plan    Proceed with breast biopsy  Follow path results for LN and non-mass enhancement biopsy  Recommend KEYNOTE 522, tentative start date is 07/23/2025  Staging work up with CT CAP and bone scan  ECHO Cardiogram  Mediport placement  Chemotherapy Class  RTC 1 week to review path results and finalize treatment plan          [1]   Allergies  Allergen Reactions    Amoxicillin Rash   [2]   Current Outpatient Medications   Medication Sig Dispense Refill    MAGNESIUM ORAL Take by mouth once daily.      multivitamin tablet Take 1 tablet by mouth once daily.      ASCORBIC ACID, VITAMIN C, ORAL Take by mouth once daily. (Patient not taking: Reported on 7/8/2025)      BIOTIN ORAL Take by mouth. (Patient not taking: Reported on 12/2/2024)      calcium/magnesium/capsai/gingr (CA  CARB-MAGNESIUM-CAPS-RANDI ORAL) Take by mouth. (Patient not taking: Reported on 7/8/2025)      cholecalciferol (Vitamin D-3) 5,000 Units tablet Take by mouth once daily. (Patient not taking: Reported on 6/16/2025)      tretinoin microspheres (Retin-A Micro Pump) 0.08 % gel with pump Apply to face at bedtime as directed (Patient not taking: Reported on 7/8/2025) 50 g 3     No current facility-administered medications for this visit.   [3]   Past Medical History:  Diagnosis Date    Breast cancer June 7 2025    Other specified health status 04/17/2014    No pertinent past medical history    Personal history of malignant neoplasm of other organs and systems     History of squamous cell carcinoma    Personal history of other diseases of the respiratory system 10/28/2018    History of sore throat    Personal history of other infectious and parasitic diseases     History of varicella   [4]   Past Surgical History:  Procedure Laterality Date    ANTERIOR CRUCIATE LIGAMENT REPAIR  04/17/2014    Primary Repair Of Knee Ligament Cruciate Anterior    BREAST BIOPSY  June 2 2025    KNEE SURGERY  04/17/2014    Knee Surgery    MOUTH SURGERY  04/17/2014    Oral Surgery Tooth Extraction    OTHER SURGICAL HISTORY  04/17/2014    Knee Arthroscopy With Medial Collateral Ligament Repair    OTHER SURGICAL HISTORY  01/20/2021    Mass excision    OTHER SURGICAL HISTORY  03/14/2016    History Of Prior Surgery   [5]   Family History  Problem Relation Name Age of Onset    Lung cancer Mother      Prostate cancer Father      Cancer Mother Rosangela Gaytan lung 60 - 69

## 2025-07-09 ENCOUNTER — TELEPHONE (OUTPATIENT)
Dept: PRIMARY CARE | Facility: CLINIC | Age: 60
End: 2025-07-09
Payer: COMMERCIAL

## 2025-07-09 LAB
LAB AP ASR DISCLAIMER: NORMAL
LABORATORY COMMENT REPORT: NORMAL
PATH REPORT.FINAL DX SPEC: NORMAL
PATH REPORT.GROSS SPEC: NORMAL
PATH REPORT.RELEVANT HX SPEC: NORMAL
PATH REPORT.TOTAL CANCER: NORMAL

## 2025-07-09 RX ORDER — HEPARIN 100 UNIT/ML
500 SYRINGE INTRAVENOUS AS NEEDED
OUTPATIENT
Start: 2025-07-09

## 2025-07-09 RX ORDER — HEPARIN SODIUM,PORCINE/PF 10 UNIT/ML
50 SYRINGE (ML) INTRAVENOUS AS NEEDED
OUTPATIENT
Start: 2025-07-09

## 2025-07-09 RX ORDER — PROCHLORPERAZINE MALEATE 10 MG
10 TABLET ORAL EVERY 6 HOURS PRN
Qty: 30 TABLET | Refills: 5 | Status: SHIPPED | OUTPATIENT
Start: 2025-07-09

## 2025-07-09 RX ORDER — ONDANSETRON 8 MG/1
8 TABLET, FILM COATED ORAL EVERY 8 HOURS PRN
Qty: 30 TABLET | Refills: 5 | Status: SHIPPED | OUTPATIENT
Start: 2025-07-09

## 2025-07-10 ENCOUNTER — TELEPHONE (OUTPATIENT)
Dept: RADIOLOGY | Facility: CLINIC | Age: 60
End: 2025-07-10

## 2025-07-10 NOTE — TELEPHONE ENCOUNTER
Called patient to follow up post biopsy.  Patient is doing very well.  Has minimal soreness and minimal bruising.  Denies bleeding.  Ema Fish RN

## 2025-07-15 ENCOUNTER — HOSPITAL ENCOUNTER (OUTPATIENT)
Dept: CARDIOLOGY | Facility: HOSPITAL | Age: 60
Discharge: HOME | End: 2025-07-15
Payer: COMMERCIAL

## 2025-07-15 DIAGNOSIS — C50.212 MALIGNANT NEOPLASM OF UPPER-INNER QUADRANT OF LEFT BREAST IN FEMALE, ESTROGEN RECEPTOR NEGATIVE: ICD-10-CM

## 2025-07-15 DIAGNOSIS — C50.912 MALIGNANT NEOPLASM OF UNSPECIFIED SITE OF LEFT FEMALE BREAST: ICD-10-CM

## 2025-07-15 DIAGNOSIS — Z17.1 MALIGNANT NEOPLASM OF UPPER-INNER QUADRANT OF LEFT BREAST IN FEMALE, ESTROGEN RECEPTOR NEGATIVE: ICD-10-CM

## 2025-07-15 LAB
EJECTION FRACTION APICAL 4 CHAMBER: 56.1
EJECTION FRACTION: 63 %
LAB AP ASR DISCLAIMER: NORMAL
LAB AP BLOCK FOR ADDITIONAL STUDIES: NORMAL
LABORATORY COMMENT REPORT: NORMAL
LEFT VENTRICLE INTERNAL DIMENSION DIASTOLE: 4.57 CM (ref 3.5–6)
LV EJECTION FRACTION BIPLANE: 59 %
PATH REPORT.COMMENTS IMP SPEC: NORMAL
PATH REPORT.FINAL DX SPEC: NORMAL
PATH REPORT.GROSS SPEC: NORMAL
PATH REPORT.TOTAL CANCER: NORMAL
PATHOLOGY SYNOPTIC REPORT: NORMAL
RIGHT VENTRICLE FREE WALL PEAK S': 14.6 CM/S
TRICUSPID ANNULAR PLANE SYSTOLIC EXCURSION: 2.5 CM

## 2025-07-15 PROCEDURE — 76376 3D RENDER W/INTRP POSTPROCES: CPT

## 2025-07-15 PROCEDURE — 93325 DOPPLER ECHO COLOR FLOW MAPG: CPT | Performed by: INTERNAL MEDICINE

## 2025-07-15 PROCEDURE — 76376 3D RENDER W/INTRP POSTPROCES: CPT | Performed by: INTERNAL MEDICINE

## 2025-07-15 PROCEDURE — 93356 MYOCRD STRAIN IMG SPCKL TRCK: CPT | Performed by: INTERNAL MEDICINE

## 2025-07-15 PROCEDURE — 93308 TTE F-UP OR LMTD: CPT | Performed by: INTERNAL MEDICINE

## 2025-07-15 PROCEDURE — 93321 DOPPLER ECHO F-UP/LMTD STD: CPT | Performed by: INTERNAL MEDICINE

## 2025-07-16 ENCOUNTER — OFFICE VISIT (OUTPATIENT)
Dept: HEMATOLOGY/ONCOLOGY | Facility: HOSPITAL | Age: 60
End: 2025-07-16
Payer: COMMERCIAL

## 2025-07-16 ENCOUNTER — LAB (OUTPATIENT)
Dept: LAB | Facility: HOSPITAL | Age: 60
End: 2025-07-16
Payer: COMMERCIAL

## 2025-07-16 VITALS
HEIGHT: 64 IN | TEMPERATURE: 97.7 F | RESPIRATION RATE: 17 BRPM | WEIGHT: 132.5 LBS | SYSTOLIC BLOOD PRESSURE: 106 MMHG | BODY MASS INDEX: 22.62 KG/M2 | HEART RATE: 71 BPM | OXYGEN SATURATION: 97 % | DIASTOLIC BLOOD PRESSURE: 67 MMHG

## 2025-07-16 DIAGNOSIS — C50.212 MALIGNANT NEOPLASM OF UPPER-INNER QUADRANT OF LEFT BREAST IN FEMALE, ESTROGEN RECEPTOR NEGATIVE: ICD-10-CM

## 2025-07-16 DIAGNOSIS — Z17.1 MALIGNANT NEOPLASM OF UPPER-INNER QUADRANT OF LEFT BREAST IN FEMALE, ESTROGEN RECEPTOR NEGATIVE: ICD-10-CM

## 2025-07-16 LAB
ALBUMIN SERPL BCP-MCNC: 4.7 G/DL (ref 3.4–5)
ALP SERPL-CCNC: 48 U/L (ref 33–110)
ALT SERPL W P-5'-P-CCNC: 15 U/L (ref 7–45)
ANION GAP SERPL CALC-SCNC: 11 MMOL/L (ref 10–20)
AST SERPL W P-5'-P-CCNC: 15 U/L (ref 9–39)
BASOPHILS # BLD AUTO: 0.02 X10*3/UL (ref 0–0.1)
BASOPHILS NFR BLD AUTO: 0.4 %
BILIRUB SERPL-MCNC: 0.4 MG/DL (ref 0–1.2)
BUN SERPL-MCNC: 10 MG/DL (ref 6–23)
CALCIUM SERPL-MCNC: 10.1 MG/DL (ref 8.6–10.3)
CHLORIDE SERPL-SCNC: 105 MMOL/L (ref 98–107)
CO2 SERPL-SCNC: 31 MMOL/L (ref 21–32)
CORTIS AM PEAK SERPL-MSCNC: 14.9 UG/DL (ref 5–20)
CREAT SERPL-MCNC: 0.92 MG/DL (ref 0.5–1.05)
EGFRCR SERPLBLD CKD-EPI 2021: 72 ML/MIN/1.73M*2
EOSINOPHIL # BLD AUTO: 0.02 X10*3/UL (ref 0–0.7)
EOSINOPHIL NFR BLD AUTO: 0.4 %
ERYTHROCYTE [DISTWIDTH] IN BLOOD BY AUTOMATED COUNT: 12.2 % (ref 11.5–14.5)
GLUCOSE SERPL-MCNC: 97 MG/DL (ref 74–99)
HCT VFR BLD AUTO: 43.4 % (ref 36–46)
HGB BLD-MCNC: 14.5 G/DL (ref 12–16)
IMM GRANULOCYTES # BLD AUTO: 0.02 X10*3/UL (ref 0–0.7)
IMM GRANULOCYTES NFR BLD AUTO: 0.4 % (ref 0–0.9)
INR PPP: 1.2 (ref 0.9–1.1)
LYMPHOCYTES # BLD AUTO: 0.73 X10*3/UL (ref 1.2–4.8)
LYMPHOCYTES NFR BLD AUTO: 13.7 %
MCH RBC QN AUTO: 31.5 PG (ref 26–34)
MCHC RBC AUTO-ENTMCNC: 33.4 G/DL (ref 32–36)
MCV RBC AUTO: 94 FL (ref 80–100)
MONOCYTES # BLD AUTO: 0.54 X10*3/UL (ref 0.1–1)
MONOCYTES NFR BLD AUTO: 10.2 %
NEUTROPHILS # BLD AUTO: 3.98 X10*3/UL (ref 1.2–7.7)
NEUTROPHILS NFR BLD AUTO: 74.9 %
NRBC BLD-RTO: 0 /100 WBCS (ref 0–0)
PLATELET # BLD AUTO: 194 X10*3/UL (ref 150–450)
POTASSIUM SERPL-SCNC: 4.9 MMOL/L (ref 3.5–5.3)
PROT SERPL-MCNC: 7.4 G/DL (ref 6.4–8.2)
PROTHROMBIN TIME: 12.9 SECONDS (ref 9.8–12.4)
RBC # BLD AUTO: 4.6 X10*6/UL (ref 4–5.2)
SODIUM SERPL-SCNC: 142 MMOL/L (ref 136–145)
TSH SERPL-ACNC: 0.6 MIU/L (ref 0.44–3.98)
WBC # BLD AUTO: 5.3 X10*3/UL (ref 4.4–11.3)

## 2025-07-16 PROCEDURE — 99215 OFFICE O/P EST HI 40 MIN: CPT | Performed by: INTERNAL MEDICINE

## 2025-07-16 PROCEDURE — 36415 COLL VENOUS BLD VENIPUNCTURE: CPT

## 2025-07-16 PROCEDURE — 82533 TOTAL CORTISOL: CPT

## 2025-07-16 PROCEDURE — 85610 PROTHROMBIN TIME: CPT

## 2025-07-16 PROCEDURE — 85025 COMPLETE CBC W/AUTO DIFF WBC: CPT

## 2025-07-16 PROCEDURE — 84443 ASSAY THYROID STIM HORMONE: CPT

## 2025-07-16 PROCEDURE — 1036F TOBACCO NON-USER: CPT | Performed by: INTERNAL MEDICINE

## 2025-07-16 PROCEDURE — 80053 COMPREHEN METABOLIC PANEL: CPT

## 2025-07-16 PROCEDURE — 3008F BODY MASS INDEX DOCD: CPT | Performed by: INTERNAL MEDICINE

## 2025-07-16 PROCEDURE — 82024 ASSAY OF ACTH: CPT

## 2025-07-16 ASSESSMENT — PAIN SCALES - GENERAL: PAINLEVEL_OUTOF10: 0-NO PAIN

## 2025-07-16 NOTE — PROGRESS NOTES
Patient Visit Information:   Date of Service: 7/8/2025   Referring Provider: Dr. Anita Le  Visit Type: New Visit      Cancer History:          Breast         AJCC Edition: 8th (AJCC), Diagnosis Date: 6/16/2025     Treatment Synopsis:    59 y.o. 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  The patient's breast cancer was diagnosed on June 16, 2025, and is grade 3, Triple Negative, estrogen receptor <1%, progesterone receptor <1% and HER-2/cinthia negative.        ONCOLOGIC HISTORY:    CURRENT THERAPY: KEYNOTE 522   Cancer Staging   Malignant neoplasm of upper-inner quadrant of left breast in female, estrogen receptor negative  Staging form: Breast, AJCC 8th Edition  - Clinical stage from 6/16/2025: Stage IIB - Signed by Madiha Driscoll MD on 7/9/2025  cT2  cN0  cM0  Damon grade: G3  ER Status: Negative  MS Status: Negative  HER2 Status: Negative     Details of her history are as follows:     5/31/25: Patient underwent bilateral screening mammogram which demonstrated extremely dense breast tissue, BI-RADS Category 0, A focal asymmetry with associated calcifications is seen in central left breast posteriorly.  No suspicious masses or calcifications were identified in the right breast.  6/6/25:  Patient underwent a  left diagnostic mammogram and targeted ultrasound, BI-RADS Category 4, This revealed that previously seen focal area of mass asymmetry with associated calcifications within the upper inner quadrant of the left breast persisted. US revealed a mass at 11:00, 9 cm from the nipple measuring approximately 17 mm. No suspicious lymph nodes were seen.  6/16/25: Patient underwent an US-guided core needle biopsy of the left breast, 11:00 9cm from nipple. Pathology was consistent with above  6/25/2025: Patient underwent an MRI of the breast. This revealed an irregular rim-enhancing mass in the         superomedial breast at posterior depth corresponding to the site of biopsy proven malignancy.  It measured        2.3 x 1.9 x 2.1 cm and non mass enhancement along the superior and inferior medial aspect of the mass.  In        total the irregular mass and non mass enhancement measured up to 5.0 x 2.5 cm. There is a prominent level 1         axillary lymph node with cortical thickness of 0.4 cm. There is an indeterminate internal mammary lymph node        measuring 0.5 cm   07/03/2025: Patient underwent an US guided core biopsy of left axilla LN. Path results results were negative for carcinoma  07/08/2025: Patient underwent a MRI guided biopsy of non-mass enhancement. Path results were consistent with DCIS        History of Present Illness: Patient ID: Yudith Almeida is a 59 y.o. female.        Chief Complaint: Here to finalize her neoadjuvant treatment plan.       Interval History:    Ms. Almeida is a pleasant 59 y.o. 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  The patient's breast cancer was diagnosed on June 16, 2025, and is grade 3, Triple Negative, estrogen receptor <1%, progesterone receptor <1% and HER-2/cinthia negative.     She comes in accompanied by her .      In the interim since her last visit, patient has since undergone an US guided core biopsy of left axilla LN. Path results is negative for carcinoma.      She also completed an MRI guided biopsy of non-mass enhancement. Path was consistent with pathology.      She has no complaints today.         Review of Systems:      A 14-point review of system was completed and was negative except for what is noted in HPI.         Allergies and Intolerances:       Allergies: [Allergies]     [Allergies]       Allergen Reactions    Amoxicillin Rash                Outpatient Medication Profile: [Current Medications]     [Current Medications]          Current Outpatient Medications   Medication Sig Dispense Refill    MAGNESIUM ORAL Take by mouth once daily.        multivitamin tablet Take 1 tablet by mouth once daily.        ASCORBIC ACID, VITAMIN C, ORAL Take by mouth once daily. (Patient not taking: Reported on 7/8/2025)        BIOTIN ORAL Take by mouth. (Patient not taking: Reported on 12/2/2024)        calcium/magnesium/capsai/gingr (CA CARB-MAGNESIUM-CAPS-RANDI ORAL) Take by mouth. (Patient not taking: Reported on 7/8/2025)        cholecalciferol (Vitamin D-3) 5,000 Units tablet Take by mouth once daily. (Patient not taking: Reported on 6/16/2025)        tretinoin microspheres (Retin-A Micro Pump) 0.08 % gel with pump Apply to face at bedtime as directed (Patient not taking: Reported on 7/8/2025) 50 g 3      No current facility-administered medications for this visit.              Medical History:  [Medical History]     [Medical History]  Past Medical History       Diagnosis Date    Breast cancer June 7 2025    Other specified health status 04/17/2014     No pertinent past medical history    Personal history of malignant neoplasm of other organs and systems       History of squamous cell carcinoma    Personal history of other diseases of the respiratory system 10/28/2018     History of sore throat    Personal history of other infectious and parasitic diseases       History of varicella     Surgical History: [Surgical History]     [Surgical History]  Past Surgical History        Procedure Laterality Date    ANTERIOR CRUCIATE LIGAMENT REPAIR   04/17/2014     Primary Repair Of Knee Ligament Cruciate Anterior    BREAST BIOPSY   June 2 2025    KNEE SURGERY   04/17/2014     Knee Surgery    MOUTH SURGERY   04/17/2014     Oral Surgery Tooth Extraction    OTHER SURGICAL HISTORY   04/17/2014     Knee Arthroscopy With Medial Collateral Ligament Repair    OTHER SURGICAL HISTORY   01/20/2021     Mass excision    OTHER SURGICAL HISTORY   03/14/2016     History Of  Prior Surgery     Family History: [Family History]    [Family History]         Problem Relation Name Age of Onset    Lung cancer Mother        Prostate cancer Father        Cancer Mother Rosangela Gaytan lung 60 - 69     Family Oncology History:  Cancer-related family history includes Cancer (age of onset: 60 - 69) in her mother; Lung cancer in her mother; Prostate cancer in her father.   Social Substance History:   Social History               Socioeconomic History    Marital status:        Spouse name: Not on file    Number of children: Not on file    Years of education: Not on file    Highest education level: Not on file   Occupational History    Not on file   Tobacco Use    Smoking status: Never    Smokeless tobacco: Never   Substance and Sexual Activity    Alcohol use: Not Currently       Alcohol/week: 6.0 standard drinks of alcohol       Types: 2 Glasses of wine, 4 Cans of beer per week       Comment: No drinks after i felt lump    Drug use: Never    Sexual activity: Not Currently       Partners: Male       Birth control/protection: Abstinence, Post-menopausal   Other Topics Concern    Not on file   Social History Narrative    Not on file      Social Drivers of Health           Financial Resource Strain: Low Risk  (7/3/2025)     Overall Financial Resource Strain (CARDIA)      Difficulty of Paying Living Expenses: Not hard at all   Food Insecurity: No Food Insecurity (7/3/2025)     Hunger Vital Sign      Worried About Running Out of Food in the Last Year: Never true      Ran Out of Food in the Last Year: Never true   Transportation Needs: No Transportation Needs (7/3/2025)     PRAPARE - Transportation      Lack of Transportation (Medical): No      Lack of Transportation (Non-Medical): No   Physical Activity: Sufficiently Active (7/3/2025)     Exercise Vital Sign      Days of Exercise per Week: 5 days      Minutes of Exercise per Session: 30 min   Stress: No Stress Concern Present (7/3/2025)     [place]  "[place] [employer] - Occupational Stress Questionnaire      Feeling of Stress : Not at all   Social Connections: Moderately Isolated (7/3/2025)     Social Connection and Isolation Panel [NHANES]      Frequency of Communication with Friends and Family: More than three times a week      Frequency of Social Gatherings with Friends and Family: Once a week      Attends Zoroastrianism Services: Never      Active Member of Clubs or Organizations: No      Attends Club or Organization Meetings: Never      Marital Status:    Intimate Partner Violence: Not At Risk (7/3/2025)     Humiliation, Afraid, Rape, and Kick questionnaire      Fear of Current or Ex-Partner: No      Emotionally Abused: No      Physically Abused: No      Sexually Abused: No   Housing Stability: Low Risk  (7/3/2025)     Housing Stability Vital Sign      Unable to Pay for Housing in the Last Year: No      Number of Times Moved in the Last Year: 0      Homeless in the Last Year: No         Additional Social History:     Breast Cancer Risk Factors:   REPRODUCTIVE HEALTH: menarche at 16, post-menopausal at 55, , with no hormone exposure such as birth control pills or post menopausal estrogen     OBJECTIVE:     VS / Pain:  /81   Pulse 74   Temp 36.3 °C (97.3 °F) (Temporal)   Resp 16   Ht (S) 1.626 m (5' 4.02\")   Wt 58.8 kg (129 lb 10.1 oz)   SpO2 98%   BMI 22.24 kg/m²   BSA: 1.63 meters squared   Pain Scale: 0     The ECOG performance scale today is Asymptomatic  Physical Exam:      Constitutional: Well developed, awake/alert/oriented  x3, no distress, alert and cooperative   Eyes: PERRL, EOMI, clear sclera   ENMT: mucous membranes moist, no apparent injury,  no lesions seen   Head/Neck: Neck supple, no apparent injury, thyroid  without mass or tenderness, No JVD, trachea midline, no bruits   Respiratory/Thorax: Patent airways, CTAB, normal  breath sounds with good chest expansion, thorax symmetric   Cardiovascular: Regular, " rate and rhythm, no murmurs,  2+ equal pulses of the extremities, normal S 1and S 2   Gastrointestinal: Nondistended, soft, non-tender,  no rebound tenderness or guarding, no masses palpable, no organomegaly, +BS, no bruits   Musculoskeletal: ROM intact, no joint swelling, normal  strength   Extremities: Normal. No abnormalities detected   Neurological: alert and oriented x3, intact senses,  motor, response and reflexes, normal strength   Breast: Breast exam: Palpable mass in the at UIQ of left breast measuring 2-3 cm. No palpable mass in right breast. No palpable axillary or supraclavicular lymphadenopathies bilaterally. No swelling of either upper extremity which had free range of motion.     Lymphatic: No significant lymphadenopathy except as noted above   Psychological: Appropriate mood and behavior   Skin: Warm and dry, no lesions, no rashes          DIAGNOSTICRESULTSBEGIN@  MR breast bilateral w contrast full protocol  Status: Final result      PACS Images      Show images for MR breast bilateral w contrast full protocol  Signed by     Signed Time Phone Pager   Claire Pritchett MD 6/26/2025 13:02 [phone]      Exam Information     Status Exam Begun Exam Ended   Final 6/25/2025 07:49 6/25/2025 08:19      Study Result     Narrative & Impression   Interpreted By:  Claire Pritchett,   STUDY:  BI MR BREAST BILATERAL WITH CONTRAST FULL PROTOCOL;  6/25/2025 8:19 am      ACCESSION NUMBER(S):  [id number]      ORDERING CLINICIAN:  MICHELLE ZHENG      INDICATION:  Newly diagnosed left breast cancer      ,C50.212 Malignant neoplasm of upper-inner quadrant of left female  breast,Z17.1 Estrogen receptor negative status (ER-)      COMPARISON:  Mammogram and ultrasound 06/06/2025      TECHNIQUE:  Using a dedicated breast coil, STIR axial and T1-weighted fat  saturation axial images of the breasts were obtained, the latter both  before and after intravenous administration of Gadolinium DTPA. On an  independent  workstation, 3-D images were formulated using [employer]  including time enhancement curves, subtraction images and MIP images.      Intravenous contrast: 12 ML of Dotarem      FINDINGS:  Density: Extreme fibroglandular tissue.      There is symmetric minimal bilateral background enhancement.      RIGHT BREAST:  No suspicious mass or nonmass enhancement is  identified.      No axillary or internal mammary lymphadenopathy is appreciated.      LEFT BREAST: There is an irregular rim-enhancing mass in the  superomedial breast at posterior depth corresponding to the site of  biopsy proven malignancy (subtraction image 66/166). It measures 2.3  x 1.9 x 2.1 cm (trans by AP by CC). Signal void from the there is  clumped non mass enhancement along the superior and inferior medial  aspect of the mass (subtraction image 58-83/166). In total the  irregular mass and non mass enhancement measure up to 5.0 x 2.5 cm  (CC x AP). This is appreciated on a saved sagittal image. There is no  evidence of extension to scan or underlying pectoralis muscle of      There is a prominent level 1 axillary lymph node with cortical  thickness of 0.4 cm (series 7 image 40/166). There is an  indeterminate internal mammary lymph node measuring 0.5 cm (series 7,  image 46/166).      NON-BREAST FINDINGS:  None.      IMPRESSION:  Biopsy proven malignant left breast mass with adjacent clumped non  mass enhancement, in total spanning up to 5.0 cm. MRI guided biopsy  of the most anterior inferior aspect of the non-mass enhancement is  recommended if this would alter surgical management.      Left axillary lymph node with prominent cortical thickness, as well  as an indeterminate left internal mammary lymph node. MRI-directed  ultrasound of both finding is recommended. Biopsy may be performed if  indicated.      No MRI evidence of malignancy in the right breast.      BI-RADS CATEGORY:  BI-RADS Category:  4 Suspicious.  Recommendation:  Surgical Consultation  and Biopsy.  Recommended Date:  Immediate.  Laterality:  Left.      For any future breast imaging appointments, please call [phone] ([phone]).          MACRO:  Critical Finding:  See findings. Notification was initiated on  6/26/2025 at 1:01 pm by Dr. Claire Pritchett.  (**-YCF-**)  Instructions:  Surgical Consultation and Imaging Guided Biopsy.      Signed by: Claire Pritchett 6/26/2025 1:02 PM  Dictation workstation:   [id number]         No images are attached to the encounter.     @LABORATORY DATA@        Lab Results   Component Value Date     WBC 4.1 (L) 07/26/2024     HGB 14.4 07/26/2024     HCT 44.4 07/26/2024      07/26/2024     CHOL 217 (H) 07/26/2024     TRIG 163 (H) 07/26/2024     HDL 52.0 07/26/2024     ALT 15 07/26/2024     AST 17 07/26/2024      07/26/2024     K 4.9 07/26/2024      07/26/2024     CREATININE 0.95 07/26/2024     BUN 17 07/26/2024     CO2 28 07/26/2024     TSH 1.92 09/15/2021     INR 1.1 01/04/2021        Assessment and Plan:   Assessment  Ms. Almeida is a pleasant 59 y.o. 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  The patient's breast cancer was diagnosed on June 16, 2025, and is grade 3, Triple Negative, estrogen receptor <1%, progesterone receptor <1% and HER-2/cinthia negative.     She comes in accompanied by her  for neoadjuvant treatment consideration.      Given that tumor size is at least 2.3 cm and may actually be greater than 5 cm she will benefit from neoadjuvant chemotherapy with Taxol, Carboplatin, Adriamycin and Cyclophosphamide given concurrently with Pembrolizumab (KEYNOTE 522).        Side effects that were discussed included but were not limited to myelosuppression, febrile neutropenia, nausea, vomiting diarrhea, dehydration,  acute hypersensitivity reaction, alopecia, peripheral neuropathy, liver and kidney toxicity,  cardiotoxicity,  other malignancies and peripheral edema.     After much deliberation patient agreed to proceed with treatment. Informed consent was signed.     Plan    Recommend KEYNOTE 522, tentative start date is 07/23/2025  Staging work up with CT CAP and bone scan. Scheduled for next week  ECHO Cardiogram. Done  Mediport placement. Pending  Chemotherapy Class  RTC 1 week for First Cycle of Taxol Carbo and Pembrolizumab

## 2025-07-16 NOTE — PATIENT INSTRUCTIONS
"Please call us at [phone] option 5 with general questions or option 6 if you are sick or have symptoms.      You will be starting chemotherapy regimen Keynote 522 (Keytruda, Carboplatin, Taxol, Adriamycin, Cytoxan)  -Please review the medication hand outs you were given at your visit.  -You will receive 12 treatments given weekly followed by 4 treatments given every 3 weeks  -You will receive a total of 16 treatments over 24 weeks  -Before your first treatment you will need Echo, Labs, and Port placement              Over the counter medications to have at home  - Imodium: take as needed to treat diarrhea   - Jane lax: take as needed to treat constipation      Anti-Nausea prescriptions to  at pharmacy the day before your first treatment:  - Zofran 8mg tablet: Take 1 tablet (8 mg) by mouth every 8  hours if needed for nausea or vomiting   - Compazine 10mg tablet: Take 1 tablet (10 mg) by mouth every 6 hours if needed for nausea or vomiting       You have been sized for cold cap. Your size is __________. The order for your cap has been sent to [facility]. If you do not receive a call from [facility] in 1 week please call them at [phone] to organize delivery.     Once your kit is delivered please try the cap on at least 3 times prior to treatment. Trying on your cap is to ensure a secure fit.  While receiving treatments gently brush or comb hair once daily with tools provided in [facility] kit. Use paraben-free and sulfate-free shampoo and conditioner. Wash hair no more than twice a week. Refrain from placing hair in a tight style like a ponytail or bun.\"   "

## 2025-07-17 ENCOUNTER — PATIENT OUTREACH (OUTPATIENT)
Dept: HEMATOLOGY/ONCOLOGY | Facility: HOSPITAL | Age: 60
End: 2025-07-17
Payer: COMMERCIAL

## 2025-07-18 LAB — ACTH PLAS-MCNC: 12.3 PG/ML (ref 7.2–63.3)

## 2025-07-22 ENCOUNTER — APPOINTMENT (OUTPATIENT)
Dept: RADIOLOGY | Facility: HOSPITAL | Age: 60
End: 2025-07-22
Payer: COMMERCIAL

## 2025-07-22 ENCOUNTER — HOSPITAL ENCOUNTER (OUTPATIENT)
Dept: CARDIOLOGY | Facility: HOSPITAL | Age: 60
Discharge: HOME | End: 2025-07-22
Attending: INTERNAL MEDICINE | Admitting: INTERNAL MEDICINE
Payer: COMMERCIAL

## 2025-07-22 ENCOUNTER — HOSPITAL ENCOUNTER (OUTPATIENT)
Dept: RADIOLOGY | Facility: HOSPITAL | Age: 60
Discharge: HOME | End: 2025-07-22
Payer: COMMERCIAL

## 2025-07-22 VITALS
BODY MASS INDEX: 22.62 KG/M2 | HEART RATE: 61 BPM | HEIGHT: 64 IN | WEIGHT: 132.5 LBS | RESPIRATION RATE: 12 BRPM | OXYGEN SATURATION: 100 % | SYSTOLIC BLOOD PRESSURE: 122 MMHG | DIASTOLIC BLOOD PRESSURE: 75 MMHG

## 2025-07-22 DIAGNOSIS — Z17.1 MALIGNANT NEOPLASM OF UPPER-INNER QUADRANT OF LEFT BREAST IN FEMALE, ESTROGEN RECEPTOR NEGATIVE: ICD-10-CM

## 2025-07-22 DIAGNOSIS — C50.212 MALIGNANT NEOPLASM OF UPPER-INNER QUADRANT OF LEFT BREAST IN FEMALE, ESTROGEN RECEPTOR NEGATIVE: ICD-10-CM

## 2025-07-22 LAB
ALBUMIN SERPL BCP-MCNC: 4.4 G/DL (ref 3.4–5)
ALP SERPL-CCNC: 36 U/L (ref 33–110)
ALT SERPL W P-5'-P-CCNC: 14 U/L (ref 7–45)
ANION GAP SERPL CALC-SCNC: 13 MMOL/L (ref 10–20)
AST SERPL W P-5'-P-CCNC: 21 U/L (ref 9–39)
BASOPHILS # BLD AUTO: 0.02 X10*3/UL (ref 0–0.1)
BASOPHILS NFR BLD AUTO: 0.4 %
BILIRUB SERPL-MCNC: 0.7 MG/DL (ref 0–1.2)
BUN SERPL-MCNC: 11 MG/DL (ref 6–23)
CALCIUM SERPL-MCNC: 9.3 MG/DL (ref 8.6–10.3)
CHLORIDE SERPL-SCNC: 102 MMOL/L (ref 98–107)
CO2 SERPL-SCNC: 28 MMOL/L (ref 21–32)
CORTIS AM PEAK SERPL-MSCNC: 24.5 UG/DL (ref 5–20)
CREAT SERPL-MCNC: 0.9 MG/DL (ref 0.5–1.05)
EGFRCR SERPLBLD CKD-EPI 2021: 74 ML/MIN/1.73M*2
EOSINOPHIL # BLD AUTO: 0.02 X10*3/UL (ref 0–0.7)
EOSINOPHIL NFR BLD AUTO: 0.4 %
ERYTHROCYTE [DISTWIDTH] IN BLOOD BY AUTOMATED COUNT: 11.9 % (ref 11.5–14.5)
GLUCOSE SERPL-MCNC: 90 MG/DL (ref 74–99)
HCT VFR BLD AUTO: 40.4 % (ref 36–46)
HGB BLD-MCNC: 13.8 G/DL (ref 12–16)
IMM GRANULOCYTES # BLD AUTO: 0.02 X10*3/UL (ref 0–0.7)
IMM GRANULOCYTES NFR BLD AUTO: 0.4 % (ref 0–0.9)
LYMPHOCYTES # BLD AUTO: 0.83 X10*3/UL (ref 1.2–4.8)
LYMPHOCYTES NFR BLD AUTO: 18 %
MCH RBC QN AUTO: 31.7 PG (ref 26–34)
MCHC RBC AUTO-ENTMCNC: 34.2 G/DL (ref 32–36)
MCV RBC AUTO: 93 FL (ref 80–100)
MONOCYTES # BLD AUTO: 0.41 X10*3/UL (ref 0.1–1)
MONOCYTES NFR BLD AUTO: 8.9 %
NEUTROPHILS # BLD AUTO: 3.3 X10*3/UL (ref 1.2–7.7)
NEUTROPHILS NFR BLD AUTO: 71.9 %
NRBC BLD-RTO: 0 /100 WBCS (ref 0–0)
PLATELET # BLD AUTO: 174 X10*3/UL (ref 150–450)
POTASSIUM SERPL-SCNC: 4 MMOL/L (ref 3.5–5.3)
PROT SERPL-MCNC: 6.6 G/DL (ref 6.4–8.2)
RBC # BLD AUTO: 4.35 X10*6/UL (ref 4–5.2)
SODIUM SERPL-SCNC: 139 MMOL/L (ref 136–145)
TSH SERPL-ACNC: 0.82 MIU/L (ref 0.44–3.98)
WBC # BLD AUTO: 4.6 X10*3/UL (ref 4.4–11.3)

## 2025-07-22 PROCEDURE — 71260 CT THORAX DX C+: CPT | Performed by: STUDENT IN AN ORGANIZED HEALTH CARE EDUCATION/TRAINING PROGRAM

## 2025-07-22 PROCEDURE — 76942 ECHO GUIDE FOR BIOPSY: CPT | Mod: 59

## 2025-07-22 PROCEDURE — 3430000001 HC RX 343 DIAGNOSTIC RADIOPHARMACEUTICALS: Performed by: INTERNAL MEDICINE

## 2025-07-22 PROCEDURE — 7100000010 HC PHASE TWO TIME - EACH INCREMENTAL 1 MINUTE

## 2025-07-22 PROCEDURE — 2500000005 HC RX 250 GENERAL PHARMACY W/O HCPCS: Performed by: RADIOLOGY

## 2025-07-22 PROCEDURE — 78306 BONE IMAGING WHOLE BODY: CPT

## 2025-07-22 PROCEDURE — 99152 MOD SED SAME PHYS/QHP 5/>YRS: CPT | Performed by: RADIOLOGY

## 2025-07-22 PROCEDURE — 82533 TOTAL CORTISOL: CPT | Mod: GEALAB | Performed by: INTERNAL MEDICINE

## 2025-07-22 PROCEDURE — 7100000009 HC PHASE TWO TIME - INITIAL BASE CHARGE

## 2025-07-22 PROCEDURE — 78306 BONE IMAGING WHOLE BODY: CPT | Performed by: STUDENT IN AN ORGANIZED HEALTH CARE EDUCATION/TRAINING PROGRAM

## 2025-07-22 PROCEDURE — C1894 INTRO/SHEATH, NON-LASER: HCPCS

## 2025-07-22 PROCEDURE — 2780000003 HC OR 278 NO HCPCS

## 2025-07-22 PROCEDURE — A9503 TC99M MEDRONATE: HCPCS | Performed by: INTERNAL MEDICINE

## 2025-07-22 PROCEDURE — 84075 ASSAY ALKALINE PHOSPHATASE: CPT | Performed by: INTERNAL MEDICINE

## 2025-07-22 PROCEDURE — 2550000001 HC RX 255 CONTRASTS: Performed by: INTERNAL MEDICINE

## 2025-07-22 PROCEDURE — 36415 COLL VENOUS BLD VENIPUNCTURE: CPT | Performed by: INTERNAL MEDICINE

## 2025-07-22 PROCEDURE — 74177 CT ABD & PELVIS W/CONTRAST: CPT

## 2025-07-22 PROCEDURE — 99153 MOD SED SAME PHYS/QHP EA: CPT

## 2025-07-22 PROCEDURE — 2720000007 HC OR 272 NO HCPCS

## 2025-07-22 PROCEDURE — 82024 ASSAY OF ACTH: CPT | Performed by: INTERNAL MEDICINE

## 2025-07-22 PROCEDURE — 99152 MOD SED SAME PHYS/QHP 5/>YRS: CPT

## 2025-07-22 PROCEDURE — 36561 INSERT TUNNELED CV CATH: CPT | Mod: RT

## 2025-07-22 PROCEDURE — 77001 FLUOROGUIDE FOR VEIN DEVICE: CPT | Mod: 59

## 2025-07-22 PROCEDURE — 85025 COMPLETE CBC W/AUTO DIFF WBC: CPT | Performed by: INTERNAL MEDICINE

## 2025-07-22 PROCEDURE — C1788 PORT, INDWELLING, IMP: HCPCS

## 2025-07-22 PROCEDURE — 2500000004 HC RX 250 GENERAL PHARMACY W/ HCPCS (ALT 636 FOR OP/ED): Performed by: RADIOLOGY

## 2025-07-22 PROCEDURE — 74177 CT ABD & PELVIS W/CONTRAST: CPT | Performed by: STUDENT IN AN ORGANIZED HEALTH CARE EDUCATION/TRAINING PROGRAM

## 2025-07-22 PROCEDURE — 84443 ASSAY THYROID STIM HORMONE: CPT | Performed by: INTERNAL MEDICINE

## 2025-07-22 RX ORDER — FENTANYL CITRATE 50 UG/ML
INJECTION, SOLUTION INTRAMUSCULAR; INTRAVENOUS AS NEEDED
Status: DISCONTINUED | OUTPATIENT
Start: 2025-07-22 | End: 2025-07-22 | Stop reason: HOSPADM

## 2025-07-22 RX ORDER — HEPARIN 100 UNIT/ML
SYRINGE INTRAVENOUS AS NEEDED
Status: DISCONTINUED | OUTPATIENT
Start: 2025-07-22 | End: 2025-07-22 | Stop reason: HOSPADM

## 2025-07-22 RX ORDER — MIDAZOLAM HYDROCHLORIDE 1 MG/ML
INJECTION, SOLUTION INTRAMUSCULAR; INTRAVENOUS AS NEEDED
Status: DISCONTINUED | OUTPATIENT
Start: 2025-07-22 | End: 2025-07-22 | Stop reason: HOSPADM

## 2025-07-22 RX ORDER — LIDOCAINE HYDROCHLORIDE AND EPINEPHRINE 10; 10 UG/ML; MG/ML
INJECTION, SOLUTION INFILTRATION; PERINEURAL AS NEEDED
Status: DISCONTINUED | OUTPATIENT
Start: 2025-07-22 | End: 2025-07-22 | Stop reason: HOSPADM

## 2025-07-22 RX ADMIN — TECHNETIUM TC 99M MEDRONATE 26.5 MILLICURIE: 25 INJECTION, POWDER, FOR SOLUTION INTRAVENOUS at 10:45

## 2025-07-22 RX ADMIN — IOHEXOL 75 ML: 350 INJECTION, SOLUTION INTRAVENOUS at 11:13

## 2025-07-22 RX ADMIN — Medication 2 L/MIN: at 08:41

## 2025-07-22 RX ADMIN — HEPARIN 400 UNITS: 100 SYRINGE at 09:01

## 2025-07-22 RX ADMIN — MIDAZOLAM 1 MG: 1 INJECTION INTRAMUSCULAR; INTRAVENOUS at 08:49

## 2025-07-22 RX ADMIN — FENTANYL CITRATE 100 MCG: 50 INJECTION, SOLUTION INTRAMUSCULAR; INTRAVENOUS at 08:50

## 2025-07-22 RX ADMIN — LIDOCAINE HYDROCHLORIDE,EPINEPHRINE BITARTRATE 20 ML: 10; .01 INJECTION, SOLUTION INFILTRATION; PERINEURAL at 08:54

## 2025-07-22 ASSESSMENT — PAIN SCALES - GENERAL
PAINLEVEL_OUTOF10: 0 - NO PAIN

## 2025-07-22 ASSESSMENT — PAIN - FUNCTIONAL ASSESSMENT
PAIN_FUNCTIONAL_ASSESSMENT: 0-10

## 2025-07-22 NOTE — PRE-PROCEDURE NOTE
Interventional Radiology Preprocedure Note    Indication for procedure: The encounter diagnosis was Malignant neoplasm of upper-inner quadrant of left breast in female, estrogen receptor negative.    Relevant review of systems: NA    Relevant Labs:   Lab Results   Component Value Date    CREATININE 0.92 07/16/2025    EGFR 72 07/16/2025    INR 1.2 (H) 07/16/2025    PROTIME 12.9 (H) 07/16/2025       Planned Sedation/Anesthesia: Moderate    Airway assessment: normal    Directed physical examination:    RRR  CTA    Mallampati: II (hard and soft palate, upper portion of tonsils and uvula visible)    ASA Score: ASA 2 - Patient with mild systemic disease with no functional limitations    Benefits, risks and alternatives of procedure and planned sedation have been discussed with the patient and/or their representative. All questions answered and they agree to proceed.

## 2025-07-22 NOTE — SIGNIFICANT EVENT
Pt returned to [facility] 1 from lab 1, right upper chest dressing dry and intact. Pt sitting up, eating and drinking, no c/o at this time

## 2025-07-22 NOTE — DISCHARGE INSTRUCTIONS
Keep incision clean and dry.  Remove dressing in 48hrs.  Do not get incision site wet until edges appear healed, approximately 7 days.  Do not get into any standing water for 1 week.  Do not drive or operate any machinery for 24hrs..  Do not drink any alcohol for 24hrs.   Do not make any important decisions for 24hrs.

## 2025-07-22 NOTE — SIGNIFICANT EVENT
Pt has CT scan and NM scan to follow later this morning, leaving IV in place for those tests per communication with those departments this morning. IV to be removed by that last department after testing completed

## 2025-07-22 NOTE — POST-PROCEDURE NOTE
Interventional Radiology Brief Postprocedure Note    Attending: Yasir Fatima MD      Assistant: none    Diagnosis: breast ca    Description of procedure: port placement     Anesthesia:  Local, mod sed    Complications: None    Estimated Blood Loss: minimal    Medications (Filter: Administrations occurring from 0911 to 0911 on 07/22/25) As of 07/22/25 0911      None          No specimens collected      See detailed result report with images in PACS.    The patient tolerated the procedure well without incident or complication and is in stable condition.

## 2025-07-23 ENCOUNTER — APPOINTMENT (OUTPATIENT)
Dept: RADIOLOGY | Facility: HOSPITAL | Age: 60
End: 2025-07-23
Payer: COMMERCIAL

## 2025-07-23 ENCOUNTER — APPOINTMENT (OUTPATIENT)
Dept: HEMATOLOGY/ONCOLOGY | Facility: HOSPITAL | Age: 60
End: 2025-07-23
Payer: COMMERCIAL

## 2025-07-23 ENCOUNTER — INFUSION (OUTPATIENT)
Dept: HEMATOLOGY/ONCOLOGY | Facility: HOSPITAL | Age: 60
End: 2025-07-23
Payer: COMMERCIAL

## 2025-07-23 ENCOUNTER — NUTRITION (OUTPATIENT)
Dept: HEMATOLOGY/ONCOLOGY | Facility: HOSPITAL | Age: 60
End: 2025-07-23
Payer: COMMERCIAL

## 2025-07-23 VITALS
SYSTOLIC BLOOD PRESSURE: 119 MMHG | WEIGHT: 129 LBS | RESPIRATION RATE: 18 BRPM | DIASTOLIC BLOOD PRESSURE: 68 MMHG | OXYGEN SATURATION: 100 % | HEART RATE: 77 BPM | BODY MASS INDEX: 22.13 KG/M2 | TEMPERATURE: 98.1 F

## 2025-07-23 VITALS — BODY MASS INDEX: 22.13 KG/M2 | HEIGHT: 64 IN

## 2025-07-23 DIAGNOSIS — C50.212 MALIGNANT NEOPLASM OF UPPER-INNER QUADRANT OF LEFT BREAST IN FEMALE, ESTROGEN RECEPTOR NEGATIVE: Primary | ICD-10-CM

## 2025-07-23 DIAGNOSIS — Z17.1 MALIGNANT NEOPLASM OF UPPER-INNER QUADRANT OF LEFT BREAST IN FEMALE, ESTROGEN RECEPTOR NEGATIVE: Primary | ICD-10-CM

## 2025-07-23 PROCEDURE — 96367 TX/PROPH/DG ADDL SEQ IV INF: CPT

## 2025-07-23 PROCEDURE — 96375 TX/PRO/DX INJ NEW DRUG ADDON: CPT | Mod: INF

## 2025-07-23 PROCEDURE — 96417 CHEMO IV INFUS EACH ADDL SEQ: CPT

## 2025-07-23 PROCEDURE — 2500000004 HC RX 250 GENERAL PHARMACY W/ HCPCS (ALT 636 FOR OP/ED): Performed by: INTERNAL MEDICINE

## 2025-07-23 PROCEDURE — 2500000001 HC RX 250 WO HCPCS SELF ADMINISTERED DRUGS (ALT 637 FOR MEDICARE OP): Performed by: INTERNAL MEDICINE

## 2025-07-23 PROCEDURE — 96413 CHEMO IV INFUSION 1 HR: CPT

## 2025-07-23 RX ORDER — HEPARIN SODIUM,PORCINE/PF 10 UNIT/ML
50 SYRINGE (ML) INTRAVENOUS AS NEEDED
Status: CANCELLED | OUTPATIENT
Start: 2025-07-23

## 2025-07-23 RX ORDER — ALBUTEROL SULFATE 0.83 MG/ML
3 SOLUTION RESPIRATORY (INHALATION) AS NEEDED
OUTPATIENT
Start: 2025-07-30

## 2025-07-23 RX ORDER — PALONOSETRON 0.05 MG/ML
0.25 INJECTION, SOLUTION INTRAVENOUS ONCE
OUTPATIENT
Start: 2025-07-30

## 2025-07-23 RX ORDER — PROCHLORPERAZINE EDISYLATE 5 MG/ML
10 INJECTION INTRAMUSCULAR; INTRAVENOUS EVERY 6 HOURS PRN
Status: DISCONTINUED | OUTPATIENT
Start: 2025-07-23 | End: 2025-07-23 | Stop reason: HOSPADM

## 2025-07-23 RX ORDER — DIPHENHYDRAMINE HYDROCHLORIDE 50 MG/ML
50 INJECTION, SOLUTION INTRAMUSCULAR; INTRAVENOUS AS NEEDED
Status: DISCONTINUED | OUTPATIENT
Start: 2025-07-23 | End: 2025-07-23 | Stop reason: HOSPADM

## 2025-07-23 RX ORDER — DIPHENHYDRAMINE HYDROCHLORIDE 50 MG/ML
50 INJECTION, SOLUTION INTRAMUSCULAR; INTRAVENOUS AS NEEDED
OUTPATIENT
Start: 2025-07-30

## 2025-07-23 RX ORDER — HEPARIN 100 UNIT/ML
500 SYRINGE INTRAVENOUS AS NEEDED
Status: DISCONTINUED | OUTPATIENT
Start: 2025-07-23 | End: 2025-07-23 | Stop reason: HOSPADM

## 2025-07-23 RX ORDER — PROCHLORPERAZINE MALEATE 10 MG
10 TABLET ORAL EVERY 6 HOURS PRN
Status: DISCONTINUED | OUTPATIENT
Start: 2025-07-23 | End: 2025-07-23 | Stop reason: HOSPADM

## 2025-07-23 RX ORDER — DIPHENHYDRAMINE HCL 50 MG
50 CAPSULE ORAL ONCE
OUTPATIENT
Start: 2025-08-06

## 2025-07-23 RX ORDER — EPINEPHRINE 0.3 MG/.3ML
0.3 INJECTION SUBCUTANEOUS EVERY 5 MIN PRN
Status: DISCONTINUED | OUTPATIENT
Start: 2025-07-23 | End: 2025-07-23 | Stop reason: HOSPADM

## 2025-07-23 RX ORDER — PROCHLORPERAZINE MALEATE 10 MG
10 TABLET ORAL EVERY 6 HOURS PRN
OUTPATIENT
Start: 2025-08-06

## 2025-07-23 RX ORDER — EPINEPHRINE 0.3 MG/.3ML
0.3 INJECTION SUBCUTANEOUS EVERY 5 MIN PRN
OUTPATIENT
Start: 2025-08-06

## 2025-07-23 RX ORDER — PROCHLORPERAZINE EDISYLATE 5 MG/ML
10 INJECTION INTRAMUSCULAR; INTRAVENOUS EVERY 6 HOURS PRN
OUTPATIENT
Start: 2025-07-30

## 2025-07-23 RX ORDER — EPINEPHRINE 0.3 MG/.3ML
0.3 INJECTION SUBCUTANEOUS EVERY 5 MIN PRN
OUTPATIENT
Start: 2025-07-30

## 2025-07-23 RX ORDER — DIPHENHYDRAMINE HYDROCHLORIDE 50 MG/ML
50 INJECTION, SOLUTION INTRAMUSCULAR; INTRAVENOUS AS NEEDED
OUTPATIENT
Start: 2025-08-06

## 2025-07-23 RX ORDER — HEPARIN 100 UNIT/ML
500 SYRINGE INTRAVENOUS AS NEEDED
Status: CANCELLED | OUTPATIENT
Start: 2025-07-23

## 2025-07-23 RX ORDER — FAMOTIDINE 10 MG/ML
20 INJECTION, SOLUTION INTRAVENOUS ONCE AS NEEDED
OUTPATIENT
Start: 2025-08-06

## 2025-07-23 RX ORDER — FAMOTIDINE 10 MG/ML
20 INJECTION, SOLUTION INTRAVENOUS ONCE AS NEEDED
OUTPATIENT
Start: 2025-07-30

## 2025-07-23 RX ORDER — DIPHENHYDRAMINE HCL 50 MG
50 CAPSULE ORAL ONCE
Status: COMPLETED | OUTPATIENT
Start: 2025-07-23 | End: 2025-07-23

## 2025-07-23 RX ORDER — FAMOTIDINE 10 MG/ML
20 INJECTION, SOLUTION INTRAVENOUS ONCE
Status: COMPLETED | OUTPATIENT
Start: 2025-07-23 | End: 2025-07-23

## 2025-07-23 RX ORDER — PROCHLORPERAZINE MALEATE 10 MG
10 TABLET ORAL EVERY 6 HOURS PRN
OUTPATIENT
Start: 2025-07-30

## 2025-07-23 RX ORDER — PALONOSETRON 0.05 MG/ML
0.25 INJECTION, SOLUTION INTRAVENOUS ONCE
Status: COMPLETED | OUTPATIENT
Start: 2025-07-23 | End: 2025-07-23

## 2025-07-23 RX ORDER — PROCHLORPERAZINE EDISYLATE 5 MG/ML
10 INJECTION INTRAMUSCULAR; INTRAVENOUS EVERY 6 HOURS PRN
OUTPATIENT
Start: 2025-08-06

## 2025-07-23 RX ORDER — FAMOTIDINE 10 MG/ML
20 INJECTION, SOLUTION INTRAVENOUS ONCE
OUTPATIENT
Start: 2025-08-06

## 2025-07-23 RX ORDER — FAMOTIDINE 10 MG/ML
20 INJECTION, SOLUTION INTRAVENOUS ONCE
OUTPATIENT
Start: 2025-07-30

## 2025-07-23 RX ORDER — FAMOTIDINE 10 MG/ML
20 INJECTION, SOLUTION INTRAVENOUS ONCE AS NEEDED
Status: DISCONTINUED | OUTPATIENT
Start: 2025-07-23 | End: 2025-07-23 | Stop reason: HOSPADM

## 2025-07-23 RX ORDER — ALBUTEROL SULFATE 0.83 MG/ML
3 SOLUTION RESPIRATORY (INHALATION) AS NEEDED
Status: DISCONTINUED | OUTPATIENT
Start: 2025-07-23 | End: 2025-07-23 | Stop reason: HOSPADM

## 2025-07-23 RX ORDER — ALBUTEROL SULFATE 0.83 MG/ML
3 SOLUTION RESPIRATORY (INHALATION) AS NEEDED
OUTPATIENT
Start: 2025-08-06

## 2025-07-23 RX ORDER — PALONOSETRON 0.05 MG/ML
0.25 INJECTION, SOLUTION INTRAVENOUS ONCE
OUTPATIENT
Start: 2025-08-06

## 2025-07-23 RX ORDER — DIPHENHYDRAMINE HCL 50 MG
50 CAPSULE ORAL ONCE
OUTPATIENT
Start: 2025-07-30

## 2025-07-23 RX ADMIN — SODIUM CHLORIDE 200 MG: 9 INJECTION, SOLUTION INTRAVENOUS at 13:48

## 2025-07-23 RX ADMIN — FAMOTIDINE 20 MG: 10 INJECTION INTRAVENOUS at 13:04

## 2025-07-23 RX ADMIN — HEPARIN 500 UNITS: 100 SYRINGE at 17:49

## 2025-07-23 RX ADMIN — PALONOSETRON HYDROCHLORIDE 0.25 MG: 0.25 INJECTION INTRAVENOUS at 13:03

## 2025-07-23 RX ADMIN — CARBOPLATIN 130 MG: 600 INJECTION, SOLUTION INTRAVENOUS at 15:41

## 2025-07-23 RX ADMIN — PACLITAXEL 132 MG: 6 INJECTION, SOLUTION INTRAVENOUS at 14:37

## 2025-07-23 RX ADMIN — DEXAMETHASONE SODIUM PHOSPHATE 12 MG: 10 INJECTION, SOLUTION INTRAMUSCULAR; INTRAVENOUS at 13:03

## 2025-07-23 RX ADMIN — DIPHENHYDRAMINE HYDROCHLORIDE 50 MG: 50 CAPSULE ORAL at 13:03

## 2025-07-23 NOTE — PROGRESS NOTES
"NUTRITION Assessment NOTE    Nutrition Assessment     Reason for Visit:  Yudith Almeida is a 59 y.o. female who presents for Left side invasive breast cancer     ACTIVE TREATMENT: Pembrolizumab + Paclitaxel/Carboplatin followed by Pembrolizumab + Doxorubicin/Cyclophosphamide followed by Pembrolizumab, 21 day cycles    Met with patient and friend during Day1, cycle 1 for Nutrition Consult for weight loss    Problem List[1] Medical History[2]    Nutrition Significant labs:  Lab Results   Component Value Date/Time    GLUCOSE 90 07/22/2025 0825     07/22/2025 0825    K 4.0 07/22/2025 0825     07/22/2025 0825    CO2 28 07/22/2025 0825    ANIONGAP 13 07/22/2025 0825    BUN 11 07/22/2025 0825    CREATININE 0.90 07/22/2025 0825    EGFR 74 07/22/2025 0825    CALCIUM 9.3 07/22/2025 0825    ALBUMIN 4.4 07/22/2025 0825    ALKPHOS 36 07/22/2025 0825    PROT 6.6 07/22/2025 0825    AST 21 07/22/2025 0825    BILITOT 0.7 07/22/2025 0825    ALT 14 07/22/2025 0825     No results found for: \"VITD25\"      Anthropometrics:  Height: 162.6 cm (5' 4.02\")                  Weight History:   Daily Weight  07/23/25 : 58.5 kg (129 lb)  07/22/25 : 60.1 kg (132 lb 7.9 oz)  07/16/25 : 60.1 kg (132 lb 7.9 oz)  07/08/25 : 58.8 kg (129 lb 10.1 oz)  07/02/25 : 59 kg (130 lb)  06/24/25 : 61.3 kg (135 lb 3.2 oz)  05/31/25 : 62.1 kg (137 lb)  07/31/24 : 62.1 kg (137 lb)  05/26/24 : 65.3 kg (143 lb 15.4 oz)  01/13/24 : 63.5 kg (140 lb)    Weight Change %:  Weight History / % Weight Change: 4.6% in 1 month  Significant Weight Loss: No    Insidious loss likely due to reduced non-nutritive calories from diet with continued exercise/weight resistance program.    Nutrition History:  Food and Nutrient History  Food and Nutrient History: Appetite is good. Experienced weight loss related change in diet after diagnosis. Cut out alcholol and other unhealthy foods.    Patient and friend had many nutrition related questions regarding energy/protein, " healthy diet for cancer, foods to avoid, how to clean food for consumption, etc.         Current Medications[3]     Nutrition Focused Physical Exam Findings: Deferred d/t patient placing cooling cap.    Subcutaneous Fat Loss  Defer Subcutaneous Fat Loss Assessment: Defer all    Muscle Wasting  Defer Muscle Wasting Assessment: Defer all       Estimated Needs:  Weight Used for Equation Calculations: 58.5 kg (128 lb 15.5 oz)    Estimated Energy Needs  Total Energy Estimated Needs in 24 hours (kCal):  (7844-8074)  Energy Estimated Needs per kg Body Weight in 24 hours (kCal/kg):  (25-38)  Estimated Protein Needs  Total Protein Estimated Needs in 24 Hours (g): 70 g  Protein Estimated Needs per kg Body Weight in 24 Hours (g/kg): 1.2 g/kg  Estimated Fluid Needs  Total Fluid Estimated Needs in 24 Hours (mL): 1750 mL  Total Fluid Estimated Needs in 24 hours (mL/kg): 30 mL/kg         Nutrition Diagnosis   Malnutrition Diagnosis  Patient has Malnutrition Diagnosis: No    Nutrition Diagnosis  Patient has Nutrition Diagnosis: Yes  Diagnosis Status (1): New  Nutrition Diagnosis 1: Food and nutrition related knowledge deficit  Related to (1): lack of prior nutiriton-related education  As Evidenced by (1): verbalizes no prior knowledge.       Nutrition Interventions/Recommendations   Nutrition Prescription: Individualized Nutrition Prescription Provided for : Oral nutrition     Recommendations: Individualized Nutrition Prescription Provided for : General Healthy, Whole foods, plant based diet.    Nutrition Interventions:   Food and Nutrient Delivery: Food and Nutrition Delivery  Meals & Snacks: General Healthful Diet  Goals: Patient will consume healthy diet including >/= 70 grams protein/day.     Coordination of Care: Coordination of Nutrition Care by a Nutrition Professional  Collaboration and referral of nutrition care: Collaboration and Referral of Nutrition Care  Goals: RD will continue to work with oncology team to ensure the  best outcomes possible.     Nutrition Education:   Nutrition Education Content: Nutrition Education Content: Content related nutrition education  Goals: Patient will understand the role nutrition and diet plays in cancer care, recovery and surviorship.   Educated patient on role of adequate intake and nutrition during treatment, benefits of Plant-Based diet.   Reviewed patients nutrient requirement and provided Guidelines on High-Calorie Food List and Snack Ideas as reference to nutrient content of various food groups.  Endorsed continue with current healthy eating plan and exercise regimen.  Consult with MD any limits for resistance training of upper body  Provided dietitian contact information.                 Nutrition Monitoring and Evaluation   Food and Nutrient Intake  Monitoring and Evaluation Plan: Energy intake, Protein intake  Energy Intake: Estimated energy intake  Criteria: diet recall  Estimated protein intake: Estimated protein intake  Criteria: diet recall                        Follow Up:     Time Spent  Prep time on day of patient encounter: 5 minutes  Time spent directly with patient, family or caregiver: 20 minutes  Additional Time Spent on Patient Care Activities: 5 minutes  Documentation Time: 10 minutes  Other Time Spent: 0 minutes  Total: 40 minutes             [1]   Patient Active Problem List  Diagnosis    Malignant neoplasm of upper-inner quadrant of left breast in female, estrogen receptor negative   [2]   Past Medical History:  Diagnosis Date    Breast cancer June 7 2025    Other specified health status 04/17/2014    No pertinent past medical history    Personal history of malignant neoplasm of other organs and systems     History of squamous cell carcinoma    Personal history of other diseases of the respiratory system 10/28/2018    History of sore throat    Personal history of other infectious and parasitic diseases     History of varicella   [3]   Current Outpatient Medications:      ASCORBIC ACID, VITAMIN C, ORAL, Take by mouth once daily. (Patient not taking: Reported on 7/16/2025), Disp: , Rfl:     BIOTIN ORAL, Take by mouth. (Patient not taking: Reported on 12/2/2024), Disp: , Rfl:     calcium/magnesium/capsai/gingr (CA CARB-MAGNESIUM-CAPS-RANDI ORAL), Take by mouth. (Patient not taking: Reported on 7/8/2025), Disp: , Rfl:     cholecalciferol (Vitamin D-3) 5,000 Units tablet, Take by mouth once daily. (Patient not taking: Reported on 6/16/2025), Disp: , Rfl:     MAGNESIUM ORAL, Take by mouth once daily., Disp: , Rfl:     multivitamin tablet, Take 1 tablet by mouth once daily., Disp: , Rfl:     ondansetron (Zofran) 8 mg tablet, Take 1 tablet (8 mg) by mouth every 8 hours if needed for nausea or vomiting., Disp: 30 tablet, Rfl: 5    prochlorperazine (Compazine) 10 mg tablet, Take 1 tablet (10 mg) by mouth every 6 hours if needed for nausea or vomiting., Disp: 30 tablet, Rfl: 5    tretinoin microspheres (Retin-A Micro Pump) 0.08 % gel with pump, Apply to face at bedtime as directed (Patient not taking: Reported on 6/24/2025), Disp: 50 g, Rfl: 3  No current facility-administered medications for this visit.    Facility-Administered Medications Ordered in Other Visits:     albuterol 2.5 mg /3 mL (0.083 %) nebulizer solution 3 mL, 3 mL, nebulization, PRN, Madiha Driscoll MD    dextrose 5 % in water (D5W) bolus 500 mL, 500 mL, intravenous, PRN, Madiha Driscoll MD    diphenhydrAMINE (BENADryl) injection 50 mg, 50 mg, intravenous, PRN, Madiha Driscoll MD    EPINEPHrine (Epipen) injection syringe 0.3 mg, 0.3 mg, intramuscular, q5 min PRN, Madiha Driscoll MD    famotidine PF (Pepcid) injection 20 mg, 20 mg, intravenous, Once PRN, Madiha Driscoll MD    heparin flush 100 unit/mL syringe 500 Units, 500 Units, intra-catheter, PRN, Madiha Driscoll MD    methylPREDNISolone sod succinate (SOLU-Medrol) 40 mg/mL injection 40 mg, 40 mg, intravenous, PRN, Madiha Driscoll MD    prochlorperazine (Compazine) injection 10  mg, 10 mg, intravenous, q6h PRN, Madiha Driscoll MD    prochlorperazine (Compazine) tablet 10 mg, 10 mg, oral, q6h PRN, Madiha Driscoll MD    sodium chloride 0.9 % bolus 500 mL, 500 mL, intravenous, PRN, Madiha Driscoll MD

## 2025-07-23 NOTE — PROGRESS NOTES
Pt arrived ambulatory to infusion for treatment of pembrolizumab + taxol/carbo + cold cap.  Denies any new or worsening symptoms. Tolerated infusion without issue. Discharged in stable condition.

## 2025-07-23 NOTE — PATIENT INSTRUCTIONS
You received aloxi as a premedication to your chemotherapy today. Do not take zofran for 3 days following treatment. You may take compazine if you are nauseous during this time.        For [facility] assistance, call [phone]. They are missing some information you will need to review and make the payment. Once this is completed, they will overnight the cap for next week's treatment.

## 2025-07-24 ENCOUNTER — TELEPHONE (OUTPATIENT)
Dept: ADMISSION | Facility: HOSPITAL | Age: 60
End: 2025-07-24
Payer: COMMERCIAL

## 2025-07-24 ENCOUNTER — OFFICE VISIT (OUTPATIENT)
Dept: HEMATOLOGY/ONCOLOGY | Facility: HOSPITAL | Age: 60
End: 2025-07-24
Payer: COMMERCIAL

## 2025-07-24 ENCOUNTER — NURSE TRIAGE (OUTPATIENT)
Dept: HEMATOLOGY/ONCOLOGY | Facility: HOSPITAL | Age: 60
End: 2025-07-24
Payer: COMMERCIAL

## 2025-07-24 VITALS
BODY MASS INDEX: 22.3 KG/M2 | DIASTOLIC BLOOD PRESSURE: 58 MMHG | TEMPERATURE: 98.1 F | OXYGEN SATURATION: 97 % | WEIGHT: 130 LBS | SYSTOLIC BLOOD PRESSURE: 90 MMHG | HEART RATE: 132 BPM | RESPIRATION RATE: 18 BRPM

## 2025-07-24 DIAGNOSIS — C50.212 MALIGNANT NEOPLASM OF UPPER-INNER QUADRANT OF LEFT BREAST IN FEMALE, ESTROGEN RECEPTOR NEGATIVE: Primary | ICD-10-CM

## 2025-07-24 DIAGNOSIS — Z17.1 MALIGNANT NEOPLASM OF UPPER-INNER QUADRANT OF LEFT BREAST IN FEMALE, ESTROGEN RECEPTOR NEGATIVE: Primary | ICD-10-CM

## 2025-07-24 LAB
ACTH PLAS-MCNC: 20.4 PG/ML (ref 7.2–63.3)
ALBUMIN SERPL BCP-MCNC: 3.8 G/DL (ref 3.4–5)
ALP SERPL-CCNC: 35 U/L (ref 33–110)
ALT SERPL W P-5'-P-CCNC: 13 U/L (ref 7–45)
ANION GAP SERPL CALC-SCNC: 12 MMOL/L (ref 10–20)
APPEARANCE UR: CLEAR
AST SERPL W P-5'-P-CCNC: 14 U/L (ref 9–39)
BASOPHILS # BLD AUTO: 0.02 X10*3/UL (ref 0–0.1)
BASOPHILS NFR BLD AUTO: 0.1 %
BILIRUB SERPL-MCNC: 0.7 MG/DL (ref 0–1.2)
BILIRUB UR STRIP.AUTO-MCNC: NEGATIVE MG/DL
BUN SERPL-MCNC: 11 MG/DL (ref 6–23)
CALCIUM SERPL-MCNC: 9 MG/DL (ref 8.6–10.3)
CHLORIDE SERPL-SCNC: 102 MMOL/L (ref 98–107)
CO2 SERPL-SCNC: 27 MMOL/L (ref 21–32)
COLOR UR: ABNORMAL
CREAT SERPL-MCNC: 0.94 MG/DL (ref 0.5–1.05)
EGFRCR SERPLBLD CKD-EPI 2021: 70 ML/MIN/1.73M*2
EOSINOPHIL # BLD AUTO: 0 X10*3/UL (ref 0–0.7)
EOSINOPHIL NFR BLD AUTO: 0 %
ERYTHROCYTE [DISTWIDTH] IN BLOOD BY AUTOMATED COUNT: 12.1 % (ref 11.5–14.5)
GLUCOSE SERPL-MCNC: 127 MG/DL (ref 74–99)
GLUCOSE UR STRIP.AUTO-MCNC: NORMAL MG/DL
HCT VFR BLD AUTO: 37 % (ref 36–46)
HGB BLD-MCNC: 12.7 G/DL (ref 12–16)
IMM GRANULOCYTES # BLD AUTO: 0.07 X10*3/UL (ref 0–0.7)
IMM GRANULOCYTES NFR BLD AUTO: 0.5 % (ref 0–0.9)
KETONES UR STRIP.AUTO-MCNC: NEGATIVE MG/DL
LACTATE SERPL-SCNC: 1.6 MMOL/L (ref 0.4–2)
LEUKOCYTE ESTERASE UR QL STRIP.AUTO: ABNORMAL
LYMPHOCYTES # BLD AUTO: 0.26 X10*3/UL (ref 1.2–4.8)
LYMPHOCYTES NFR BLD AUTO: 1.9 %
MAGNESIUM SERPL-MCNC: 2.02 MG/DL (ref 1.6–2.4)
MCH RBC QN AUTO: 31.5 PG (ref 26–34)
MCHC RBC AUTO-ENTMCNC: 34.3 G/DL (ref 32–36)
MCV RBC AUTO: 92 FL (ref 80–100)
MONOCYTES # BLD AUTO: 0.23 X10*3/UL (ref 0.1–1)
MONOCYTES NFR BLD AUTO: 1.7 %
MUCOUS THREADS #/AREA URNS AUTO: ABNORMAL /LPF
NEUTROPHILS # BLD AUTO: 13.02 X10*3/UL (ref 1.2–7.7)
NEUTROPHILS NFR BLD AUTO: 95.8 %
NITRITE UR QL STRIP.AUTO: NEGATIVE
NRBC BLD-RTO: 0 /100 WBCS (ref 0–0)
PH UR STRIP.AUTO: 6.5 [PH]
PLATELET # BLD AUTO: 146 X10*3/UL (ref 150–450)
POTASSIUM SERPL-SCNC: 3.5 MMOL/L (ref 3.5–5.3)
PROT SERPL-MCNC: 6.1 G/DL (ref 6.4–8.2)
PROT UR STRIP.AUTO-MCNC: NEGATIVE MG/DL
RBC # BLD AUTO: 4.03 X10*6/UL (ref 4–5.2)
RBC # UR STRIP.AUTO: NEGATIVE MG/DL
RBC #/AREA URNS AUTO: ABNORMAL /HPF
SODIUM SERPL-SCNC: 137 MMOL/L (ref 136–145)
SP GR UR STRIP.AUTO: 1
SQUAMOUS #/AREA URNS AUTO: ABNORMAL /HPF
UROBILINOGEN UR STRIP.AUTO-MCNC: NORMAL MG/DL
WBC # BLD AUTO: 13.6 X10*3/UL (ref 4.4–11.3)
WBC #/AREA URNS AUTO: ABNORMAL /HPF

## 2025-07-24 PROCEDURE — 87075 CULTR BACTERIA EXCEPT BLOOD: CPT

## 2025-07-24 PROCEDURE — 99214 OFFICE O/P EST MOD 30 MIN: CPT | Performed by: NURSE PRACTITIONER

## 2025-07-24 PROCEDURE — 81001 URINALYSIS AUTO W/SCOPE: CPT

## 2025-07-24 PROCEDURE — 80053 COMPREHEN METABOLIC PANEL: CPT

## 2025-07-24 PROCEDURE — 2500000004 HC RX 250 GENERAL PHARMACY W/ HCPCS (ALT 636 FOR OP/ED): Performed by: NURSE PRACTITIONER

## 2025-07-24 PROCEDURE — 83605 ASSAY OF LACTIC ACID: CPT

## 2025-07-24 PROCEDURE — 83735 ASSAY OF MAGNESIUM: CPT

## 2025-07-24 PROCEDURE — 2500000004 HC RX 250 GENERAL PHARMACY W/ HCPCS (ALT 636 FOR OP/ED): Performed by: INTERNAL MEDICINE

## 2025-07-24 PROCEDURE — 87086 URINE CULTURE/COLONY COUNT: CPT

## 2025-07-24 PROCEDURE — 85025 COMPLETE CBC W/AUTO DIFF WBC: CPT

## 2025-07-24 RX ORDER — UREA 10 %
LOTION (ML) TOPICAL 3 TIMES DAILY
Qty: 85 G | Refills: 1 | Status: SHIPPED | OUTPATIENT
Start: 2025-07-24 | End: 2026-07-24

## 2025-07-24 RX ORDER — HEPARIN SODIUM,PORCINE/PF 10 UNIT/ML
50 SYRINGE (ML) INTRAVENOUS AS NEEDED
OUTPATIENT
Start: 2025-07-24

## 2025-07-24 RX ORDER — HEPARIN SODIUM,PORCINE/PF 10 UNIT/ML
50 SYRINGE (ML) INTRAVENOUS AS NEEDED
Status: DISCONTINUED | OUTPATIENT
Start: 2025-07-24 | End: 2025-07-24 | Stop reason: HOSPADM

## 2025-07-24 RX ORDER — HEPARIN 100 UNIT/ML
500 SYRINGE INTRAVENOUS AS NEEDED
Status: DISCONTINUED | OUTPATIENT
Start: 2025-07-24 | End: 2025-07-24 | Stop reason: HOSPADM

## 2025-07-24 RX ORDER — HEPARIN 100 UNIT/ML
500 SYRINGE INTRAVENOUS AS NEEDED
OUTPATIENT
Start: 2025-07-24

## 2025-07-24 RX ADMIN — SODIUM CHLORIDE, SODIUM LACTATE, POTASSIUM CHLORIDE, AND CALCIUM CHLORIDE 500 ML: .6; .31; .03; .02 INJECTION, SOLUTION INTRAVENOUS at 13:15

## 2025-07-24 RX ADMIN — HEPARIN 500 UNITS: 100 SYRINGE at 14:32

## 2025-07-24 NOTE — TELEPHONE ENCOUNTER
Spouse called because pt was expecting to have cold cap arranged for her first treatment yesterday and it was not available.  He was told that more information was needed to arrange for her next treatment 7/30.    Per infusion note yesterday, [facility] is missing some information and also requires payment.  Pt should contact [facility] assistance at [phone].  Once this is completed, they will overnight the cap for next week's treatment.  Spouse provided with this information and he will call [facility].

## 2025-07-24 NOTE — PROGRESS NOTES
Patient ID: Yudith Almeida is a 59 y.o. female.    Acute Care Clinic     Subjective    HPI  The patient presents today to [facility] with reports of chills, emesis and new PPE today. She is C1D2 of Taxol/Carbo/Pembro. She shares that her mediport was cleaned with chlorhexidine (pt has documented allergy) at her appt yesterday and she developed diffuse hives last evening which prompted her to take benadryl. The hives resolved. No e/o redness, swelling, drainage or fluid collection at mediport site. She notes the new development of redness, tenderness and swelling of bilateral hands today which appears to be PPE. This was accompanied by chills and an episode of emesis. She had a low-grade fever at home but notes varying multiple values with her home thermometer. She is afebrile in clinic today. She has mild orthostasis and tachycardia upon arrival. She denies SOB, CP, palpitations, dizziness, syncope, nausea, CD. No e/o active infection or bleeding.     ROS as per HPI    Objective    BSA: 1.63 meters squared  BP 90/58 (Patient Position: Standing)   Pulse (!) 132   Temp 36.7 °C (98.1 °F) (Temporal)   Resp 18   Wt 59 kg (130 lb)   SpO2 97%   BMI 22.30 kg/m²      Physical Exam    Constitutional: Well developed, awake/alert/oriented  x3, no distress, alert and cooperative   Eyes: PERRL, EOMI, clear sclera   ENMT: mucous membranes moist, no apparent injury,  no lesions seen   Head/Neck: Neck supple, no apparent injury, thyroid  without mass or tenderness, No JVD, trachea midline, no bruits. Mediport site is C/D/I   Respiratory/Thorax: Patent airways, CTAB, normal  breath sounds with good chest expansion, thorax symmetric   Cardiovascular: Regular, rate and rhythm, no murmurs,  2+ equal pulses of the extremities, normal S 1and S 2   Gastrointestinal: Nondistended, soft, non-tender,  no rebound tenderness or guarding, no masses palpable, no organomegaly, +BS, no bruits   Musculoskeletal: ROM intact, normal  strength    Extremities: Bilateral redness, swelling and tenderness in hands.    Neurological: alert and oriented x3, intact senses,  motor, response and reflexes, normal strength          Psychological: Appropriate mood and behavior   Skin: Warm and dry, no lesions, no rashes     Performance Status:  Asymptomatic      Assessment/Plan       Cancer Staging   Malignant neoplasm of upper-inner quadrant of left breast in female, estrogen receptor negative  Staging form: Breast, AJCC 8th Edition  - Clinical stage from 6/16/2025: Stage IIB (cT2, cN0, cM0, G3, ER-, NJ-, HER2-) - Signed by Madiha Driscoll MD on 7/9/2025      Oncology History   Malignant neoplasm of upper-inner quadrant of left breast in female, estrogen receptor negative   6/16/2025 Cancer Staged    Staging form: Breast, AJCC 8th Edition, Clinical stage from 6/16/2025: Stage IIB (cT2, cN0, cM0, G3, ER-, NJ-, HER2-) - Signed by Madiha Driscoll MD on 7/9/2025 6/23/2025 Initial Diagnosis    Malignant neoplasm of upper-inner quadrant of left breast in female, estrogen receptor negative     7/23/2025 -  Chemotherapy    Pembrolizumab + PACLitaxel / CARBOplatin followed by Pembrolizumab + DOXOrubicin / CycloPHOSphamide followed by Pembrolizumab, 21 Day Cycles          Problem List Items Addressed This Visit           ICD-10-CM       Hematology and Neoplasia    Malignant neoplasm of upper-inner quadrant of left breast in female, estrogen receptor negative - Primary C50.212, Z17.1    Relevant Medications    heparin flush 10 unit/mL syringe 50 Units    heparin flush 100 unit/mL syringe 500 Units    alteplase (Cathflo Activase) injection 2 mg    urea (Carmol) 10 % cream    Other Relevant Orders    Blood Culture    CBC and Auto Differential (Completed)    Comprehensive Metabolic Panel (Completed)    Magnesium (Completed)    Lactate (Completed)    Urinalysis with Reflex Culture and Microscopic    Nursing Communication - Vascular Access (Completed)    Venous Access, CVAD     CENTRAL VENOUS LINE DRESSING CHANGE - ADULT            [facility]    [facility] Course:  --Labs: CBD, CMP, Mag, Lactate: notable for mild increase in WBC (13.6). Lactate is NEG  --Blood culture from port and UA: pending at time of DC from [facility]. Dr. Driscoll's clinic to follow results   --500ml LR IV given  --Urea 10% cream TID to bilateral hands was sent to pt's [facility] for new PPE. She was instructed to call Dr. Driscoll's office with worsening PPE or development of blisters  Pt discharged home. Dr. Driscoll notified, agreed with plan and agreed to follow the results of her blood cultures. Pt was instructed to call Dr. Driscoll's office with recurrent chills or fevers.     Sully Grissom, CNP  [facility]

## 2025-07-24 NOTE — TELEPHONE ENCOUNTER
Per Sully Grissom CNP, they have an 11:30 appt available.    Called pt back. She accepted the appointment and is familiar with the location and check in process. Understanding verbalized to today's plan of care.    Team updated.

## 2025-07-24 NOTE — TELEPHONE ENCOUNTER
Per Dr. Au and breast team, please see if [facility] has availability. The other option is evaluation in the ED.    Messaged [facility] for availability. Awaiting response.    Called pt to see if she would be interested in our [facility] today and explained the purpose and benefit. Pt verbalized understanding and states that she could come in today if there are spots available.

## 2025-07-24 NOTE — TELEPHONE ENCOUNTER
Patient calls to state that she received her first chemotherapy treatement yesterday (Pembrolizumab + Paclitaxel + Carboplatin) and she woke up this morning with her hands and fingers feeling stiff and sore. About 15 minutes ago, she began shaking uncontrollably. She feels chilled. She did take her temperature just now which recorded at 99.4.    Denies SOB, chest pain, N/V or any other symptoms other than those listed above,    Message sent to team.

## 2025-07-25 ENCOUNTER — TELEPHONE (OUTPATIENT)
Dept: HEMATOLOGY/ONCOLOGY | Facility: HOSPITAL | Age: 60
End: 2025-07-25

## 2025-07-25 LAB
BACTERIA UR CULT: NORMAL
HOLD SPECIMEN: NORMAL

## 2025-07-27 LAB — BACTERIA BLD CULT: NORMAL

## 2025-07-28 LAB — BACTERIA BLD CULT: NORMAL

## 2025-07-30 ENCOUNTER — NURSE TRIAGE (OUTPATIENT)
Dept: ADMISSION | Facility: HOSPITAL | Age: 60
End: 2025-07-30

## 2025-07-30 ENCOUNTER — APPOINTMENT (OUTPATIENT)
Dept: HEMATOLOGY/ONCOLOGY | Facility: HOSPITAL | Age: 60
End: 2025-07-30
Payer: COMMERCIAL

## 2025-07-30 ENCOUNTER — INFUSION (OUTPATIENT)
Dept: HEMATOLOGY/ONCOLOGY | Facility: HOSPITAL | Age: 60
End: 2025-07-30
Payer: COMMERCIAL

## 2025-07-30 VITALS
DIASTOLIC BLOOD PRESSURE: 74 MMHG | SYSTOLIC BLOOD PRESSURE: 122 MMHG | RESPIRATION RATE: 18 BRPM | OXYGEN SATURATION: 100 % | TEMPERATURE: 97.7 F | BODY MASS INDEX: 21.96 KG/M2 | HEART RATE: 72 BPM | WEIGHT: 128 LBS

## 2025-07-30 DIAGNOSIS — Z17.1 MALIGNANT NEOPLASM OF UPPER-INNER QUADRANT OF LEFT BREAST IN FEMALE, ESTROGEN RECEPTOR NEGATIVE: ICD-10-CM

## 2025-07-30 DIAGNOSIS — C50.212 MALIGNANT NEOPLASM OF UPPER-INNER QUADRANT OF LEFT BREAST IN FEMALE, ESTROGEN RECEPTOR NEGATIVE: ICD-10-CM

## 2025-07-30 LAB
ALBUMIN SERPL BCP-MCNC: 4.1 G/DL (ref 3.4–5)
ALP SERPL-CCNC: 35 U/L (ref 33–110)
ALT SERPL W P-5'-P-CCNC: 11 U/L (ref 7–45)
ANION GAP SERPL CALC-SCNC: 12 MMOL/L (ref 10–20)
AST SERPL W P-5'-P-CCNC: 12 U/L (ref 9–39)
BASOPHILS # BLD AUTO: 0.02 X10*3/UL (ref 0–0.1)
BASOPHILS NFR BLD AUTO: 0.5 %
BILIRUB SERPL-MCNC: 0.6 MG/DL (ref 0–1.2)
BUN SERPL-MCNC: 14 MG/DL (ref 6–23)
CALCIUM SERPL-MCNC: 9.2 MG/DL (ref 8.6–10.3)
CHLORIDE SERPL-SCNC: 102 MMOL/L (ref 98–107)
CO2 SERPL-SCNC: 27 MMOL/L (ref 21–32)
CREAT SERPL-MCNC: 0.88 MG/DL (ref 0.5–1.05)
EGFRCR SERPLBLD CKD-EPI 2021: 76 ML/MIN/1.73M*2
EOSINOPHIL # BLD AUTO: 0.03 X10*3/UL (ref 0–0.7)
EOSINOPHIL NFR BLD AUTO: 0.7 %
ERYTHROCYTE [DISTWIDTH] IN BLOOD BY AUTOMATED COUNT: 11.9 % (ref 11.5–14.5)
GLUCOSE SERPL-MCNC: 100 MG/DL (ref 74–99)
HCT VFR BLD AUTO: 38.8 % (ref 36–46)
HGB BLD-MCNC: 13 G/DL (ref 12–16)
IMM GRANULOCYTES # BLD AUTO: 0.02 X10*3/UL (ref 0–0.7)
IMM GRANULOCYTES NFR BLD AUTO: 0.5 % (ref 0–0.9)
LYMPHOCYTES # BLD AUTO: 0.63 X10*3/UL (ref 1.2–4.8)
LYMPHOCYTES NFR BLD AUTO: 14.2 %
MCH RBC QN AUTO: 31.6 PG (ref 26–34)
MCHC RBC AUTO-ENTMCNC: 33.5 G/DL (ref 32–36)
MCV RBC AUTO: 94 FL (ref 80–100)
MONOCYTES # BLD AUTO: 0.39 X10*3/UL (ref 0.1–1)
MONOCYTES NFR BLD AUTO: 8.8 %
NEUTROPHILS # BLD AUTO: 3.34 X10*3/UL (ref 1.2–7.7)
NEUTROPHILS NFR BLD AUTO: 75.3 %
NRBC BLD-RTO: 0 /100 WBCS (ref 0–0)
PLATELET # BLD AUTO: 240 X10*3/UL (ref 150–450)
POTASSIUM SERPL-SCNC: 4.2 MMOL/L (ref 3.5–5.3)
PROT SERPL-MCNC: 6.8 G/DL (ref 6.4–8.2)
RBC # BLD AUTO: 4.12 X10*6/UL (ref 4–5.2)
SODIUM SERPL-SCNC: 137 MMOL/L (ref 136–145)
WBC # BLD AUTO: 4.4 X10*3/UL (ref 4.4–11.3)

## 2025-07-30 PROCEDURE — 2500000001 HC RX 250 WO HCPCS SELF ADMINISTERED DRUGS (ALT 637 FOR MEDICARE OP): Performed by: INTERNAL MEDICINE

## 2025-07-30 PROCEDURE — 96367 TX/PROPH/DG ADDL SEQ IV INF: CPT

## 2025-07-30 PROCEDURE — 85025 COMPLETE CBC W/AUTO DIFF WBC: CPT

## 2025-07-30 PROCEDURE — 96417 CHEMO IV INFUS EACH ADDL SEQ: CPT

## 2025-07-30 PROCEDURE — 96375 TX/PRO/DX INJ NEW DRUG ADDON: CPT | Mod: INF

## 2025-07-30 PROCEDURE — 2500000004 HC RX 250 GENERAL PHARMACY W/ HCPCS (ALT 636 FOR OP/ED): Performed by: INTERNAL MEDICINE

## 2025-07-30 PROCEDURE — 96413 CHEMO IV INFUSION 1 HR: CPT

## 2025-07-30 PROCEDURE — 84075 ASSAY ALKALINE PHOSPHATASE: CPT

## 2025-07-30 RX ORDER — FAMOTIDINE 10 MG/ML
20 INJECTION, SOLUTION INTRAVENOUS ONCE
Status: COMPLETED | OUTPATIENT
Start: 2025-07-30 | End: 2025-07-30

## 2025-07-30 RX ORDER — DIPHENHYDRAMINE HYDROCHLORIDE 50 MG/ML
50 INJECTION, SOLUTION INTRAMUSCULAR; INTRAVENOUS AS NEEDED
Status: DISCONTINUED | OUTPATIENT
Start: 2025-07-30 | End: 2025-07-30 | Stop reason: HOSPADM

## 2025-07-30 RX ORDER — PALONOSETRON 0.05 MG/ML
0.25 INJECTION, SOLUTION INTRAVENOUS ONCE
Status: COMPLETED | OUTPATIENT
Start: 2025-07-30 | End: 2025-07-30

## 2025-07-30 RX ORDER — ALBUTEROL SULFATE 0.83 MG/ML
3 SOLUTION RESPIRATORY (INHALATION) AS NEEDED
Status: DISCONTINUED | OUTPATIENT
Start: 2025-07-30 | End: 2025-07-30 | Stop reason: HOSPADM

## 2025-07-30 RX ORDER — EPINEPHRINE 0.3 MG/.3ML
0.3 INJECTION SUBCUTANEOUS EVERY 5 MIN PRN
Status: DISCONTINUED | OUTPATIENT
Start: 2025-07-30 | End: 2025-07-30 | Stop reason: HOSPADM

## 2025-07-30 RX ORDER — DIPHENHYDRAMINE HCL 50 MG
50 CAPSULE ORAL ONCE
Status: COMPLETED | OUTPATIENT
Start: 2025-07-30 | End: 2025-07-30

## 2025-07-30 RX ORDER — HEPARIN SODIUM,PORCINE/PF 10 UNIT/ML
50 SYRINGE (ML) INTRAVENOUS AS NEEDED
OUTPATIENT
Start: 2025-07-30

## 2025-07-30 RX ORDER — HEPARIN 100 UNIT/ML
500 SYRINGE INTRAVENOUS AS NEEDED
Status: DISCONTINUED | OUTPATIENT
Start: 2025-07-30 | End: 2025-07-30 | Stop reason: HOSPADM

## 2025-07-30 RX ORDER — FAMOTIDINE 10 MG/ML
20 INJECTION, SOLUTION INTRAVENOUS ONCE AS NEEDED
Status: DISCONTINUED | OUTPATIENT
Start: 2025-07-30 | End: 2025-07-30 | Stop reason: HOSPADM

## 2025-07-30 RX ORDER — PROCHLORPERAZINE EDISYLATE 5 MG/ML
10 INJECTION INTRAMUSCULAR; INTRAVENOUS EVERY 6 HOURS PRN
Status: DISCONTINUED | OUTPATIENT
Start: 2025-07-30 | End: 2025-07-30 | Stop reason: HOSPADM

## 2025-07-30 RX ORDER — HEPARIN 100 UNIT/ML
500 SYRINGE INTRAVENOUS AS NEEDED
OUTPATIENT
Start: 2025-07-30

## 2025-07-30 RX ORDER — PROCHLORPERAZINE MALEATE 10 MG
10 TABLET ORAL EVERY 6 HOURS PRN
Status: DISCONTINUED | OUTPATIENT
Start: 2025-07-30 | End: 2025-07-30 | Stop reason: HOSPADM

## 2025-07-30 RX ADMIN — PACLITAXEL 132 MG: 6 INJECTION, SOLUTION INTRAVENOUS at 10:01

## 2025-07-30 RX ADMIN — DIPHENHYDRAMINE HYDROCHLORIDE 50 MG: 50 CAPSULE ORAL at 09:37

## 2025-07-30 RX ADMIN — HEPARIN 500 UNITS: 100 SYRINGE at 13:18

## 2025-07-30 RX ADMIN — CARBOPLATIN 135 MG: 10 INJECTION, SOLUTION INTRAVENOUS at 11:07

## 2025-07-30 RX ADMIN — DEXAMETHASONE SODIUM PHOSPHATE 12 MG: 10 INJECTION, SOLUTION INTRAMUSCULAR; INTRAVENOUS at 09:36

## 2025-07-30 RX ADMIN — PALONOSETRON HYDROCHLORIDE 0.25 MG: 0.25 INJECTION INTRAVENOUS at 09:37

## 2025-07-30 RX ADMIN — FAMOTIDINE 20 MG: 10 INJECTION INTRAVENOUS at 09:37

## 2025-07-30 NOTE — PROGRESS NOTES
Pt arrived ambulatory to infusion for treatment of carboplatin/taxol + cold cap. Denies any new or worsening symptoms. Tolerated infusions without issue. Discharged in stable condition.

## 2025-07-30 NOTE — TELEPHONE ENCOUNTER
Yudith called the office because she was showering this afternoon and noticed a hemorrhoid. She has had them in the past, however, wanted to make sure it was OK for her to use her Preparation H cream since she is on treatment. Advised this is OK to use. Denies any bleeding or pain. No further questions or concerns at this time.

## 2025-07-31 ENCOUNTER — APPOINTMENT (OUTPATIENT)
Dept: PRIMARY CARE | Facility: CLINIC | Age: 60
End: 2025-07-31
Payer: COMMERCIAL

## 2025-07-31 VITALS
HEART RATE: 72 BPM | WEIGHT: 129 LBS | SYSTOLIC BLOOD PRESSURE: 113 MMHG | RESPIRATION RATE: 12 BRPM | BODY MASS INDEX: 22.02 KG/M2 | HEIGHT: 64 IN | DIASTOLIC BLOOD PRESSURE: 71 MMHG

## 2025-07-31 DIAGNOSIS — Z00.00 HEALTH CARE MAINTENANCE: Primary | ICD-10-CM

## 2025-07-31 DIAGNOSIS — E78.2 MIXED HYPERLIPIDEMIA: ICD-10-CM

## 2025-07-31 LAB
COMMENTS - MP RESULT TYPE: NORMAL
SCAN RESULT: NORMAL

## 2025-07-31 PROCEDURE — 99396 PREV VISIT EST AGE 40-64: CPT | Performed by: INTERNAL MEDICINE

## 2025-07-31 PROCEDURE — 3008F BODY MASS INDEX DOCD: CPT | Performed by: INTERNAL MEDICINE

## 2025-07-31 ASSESSMENT — PROMIS GLOBAL HEALTH SCALE
CARRYOUT_SOCIAL_ACTIVITIES: GOOD
CARRYOUT_PHYSICAL_ACTIVITIES: COMPLETELY
RATE_SOCIAL_SATISFACTION: GOOD
EMOTIONAL_PROBLEMS: NEVER
RATE_PHYSICAL_HEALTH: GOOD
RATE_GENERAL_HEALTH: GOOD
RATE_AVERAGE_PAIN: 0
RATE_QUALITY_OF_LIFE: VERY GOOD
RATE_MENTAL_HEALTH: VERY GOOD

## 2025-07-31 NOTE — PROGRESS NOTES
Subjective   Patient ID: Yudith Almeida is a 59 y.o. female who presents for No chief complaint on file..    HPI cpe follow up see updated front sheet no chest pain no shortness of breath no se with medication has been watching diet not exercising as much continued evaluation of the breast knees pain remains bowels ok     Past medical history hyperlipidemia osteoarthritis ductal carcinoma breast Ca    Medications none omega fish oil mvi    Allergies noted and unchanged see emr    Social history no tobacco alcohol social    Family history noted and unchanged    Prevention some exercise blood work and pathology reviewed previous 0 coronary artery calcium CT score due for colonoscopy around age 60    Review of Systems    Objective   There were no vitals taken for this visit.  Vital signs noted alert and oriented x 3 NCAT PERRLA EOMI nares without discharge OP benign no AC nodes no JVD or bruit no lid lag no thyromegaly chest clear to auscultation and percussion no crackles CV regular rate and rhythm S1-S2 without murmur gallop or rub abdomen soft nontender normal active bowel sounds LS spine normal curvature negative straight leg raise extremities no clubbing cyanosis or edema normal distal pulses DTR 2+ Physical Exam    Assessment/Plan impression General medical examination hyperlipidemia breast cancer other diagnoses   Plan previous blood work reviewed including a comprehensive panel.  Previous TSH was normal lipid labs reviewed from last years stable with slight elevation may redraw but hold on therapy until otherwise completed with chemotherapy has follow-up with hematology oncology diet as able exercise as able good water consumption care and safety in the home follow-up eventually for next colonoscopy recheck 3 to 6 months Endor sooner as needed continue 50 cc 26

## 2025-08-01 DIAGNOSIS — E78.2 MIXED HYPERLIPIDEMIA: ICD-10-CM

## 2025-08-01 DIAGNOSIS — Z00.00 HEALTH CARE MAINTENANCE: Primary | ICD-10-CM

## 2025-08-04 ENCOUNTER — TELEPHONE (OUTPATIENT)
Dept: GENETICS | Facility: CLINIC | Age: 60
End: 2025-08-04
Payer: COMMERCIAL

## 2025-08-06 ENCOUNTER — INFUSION (OUTPATIENT)
Dept: HEMATOLOGY/ONCOLOGY | Facility: HOSPITAL | Age: 60
End: 2025-08-06
Payer: COMMERCIAL

## 2025-08-06 ENCOUNTER — OFFICE VISIT (OUTPATIENT)
Dept: HEMATOLOGY/ONCOLOGY | Facility: HOSPITAL | Age: 60
End: 2025-08-06
Payer: COMMERCIAL

## 2025-08-06 VITALS
HEIGHT: 64 IN | OXYGEN SATURATION: 96 % | DIASTOLIC BLOOD PRESSURE: 73 MMHG | BODY MASS INDEX: 21.87 KG/M2 | SYSTOLIC BLOOD PRESSURE: 114 MMHG | TEMPERATURE: 97.5 F | HEART RATE: 74 BPM | RESPIRATION RATE: 16 BRPM | WEIGHT: 128.09 LBS

## 2025-08-06 DIAGNOSIS — Z17.1 MALIGNANT NEOPLASM OF UPPER-INNER QUADRANT OF LEFT BREAST IN FEMALE, ESTROGEN RECEPTOR NEGATIVE: Primary | ICD-10-CM

## 2025-08-06 DIAGNOSIS — C50.212 MALIGNANT NEOPLASM OF UPPER-INNER QUADRANT OF LEFT BREAST IN FEMALE, ESTROGEN RECEPTOR NEGATIVE: Primary | ICD-10-CM

## 2025-08-06 LAB
ALBUMIN SERPL BCP-MCNC: 4 G/DL (ref 3.4–5)
ALP SERPL-CCNC: 37 U/L (ref 33–110)
ALT SERPL W P-5'-P-CCNC: 13 U/L (ref 7–45)
ANION GAP SERPL CALC-SCNC: 11 MMOL/L (ref 10–20)
AST SERPL W P-5'-P-CCNC: 13 U/L (ref 9–39)
BASOPHILS # BLD AUTO: 0.02 X10*3/UL (ref 0–0.1)
BASOPHILS NFR BLD AUTO: 0.5 %
BILIRUB SERPL-MCNC: 0.4 MG/DL (ref 0–1.2)
BUN SERPL-MCNC: 16 MG/DL (ref 6–23)
CALCIUM SERPL-MCNC: 9 MG/DL (ref 8.6–10.3)
CHLORIDE SERPL-SCNC: 105 MMOL/L (ref 98–107)
CO2 SERPL-SCNC: 26 MMOL/L (ref 21–32)
CREAT SERPL-MCNC: 0.76 MG/DL (ref 0.5–1.05)
EGFRCR SERPLBLD CKD-EPI 2021: 90 ML/MIN/1.73M*2
EOSINOPHIL # BLD AUTO: 0.01 X10*3/UL (ref 0–0.7)
EOSINOPHIL NFR BLD AUTO: 0.2 %
ERYTHROCYTE [DISTWIDTH] IN BLOOD BY AUTOMATED COUNT: 12.4 % (ref 11.5–14.5)
GLUCOSE SERPL-MCNC: 99 MG/DL (ref 74–99)
HCT VFR BLD AUTO: 36.4 % (ref 36–46)
HGB BLD-MCNC: 12.5 G/DL (ref 12–16)
IMM GRANULOCYTES # BLD AUTO: 0.02 X10*3/UL (ref 0–0.7)
IMM GRANULOCYTES NFR BLD AUTO: 0.5 % (ref 0–0.9)
LYMPHOCYTES # BLD AUTO: 0.69 X10*3/UL (ref 1.2–4.8)
LYMPHOCYTES NFR BLD AUTO: 16.6 %
MCH RBC QN AUTO: 32.4 PG (ref 26–34)
MCHC RBC AUTO-ENTMCNC: 34.3 G/DL (ref 32–36)
MCV RBC AUTO: 94 FL (ref 80–100)
MONOCYTES # BLD AUTO: 0.34 X10*3/UL (ref 0.1–1)
MONOCYTES NFR BLD AUTO: 8.2 %
NEUTROPHILS # BLD AUTO: 3.07 X10*3/UL (ref 1.2–7.7)
NEUTROPHILS NFR BLD AUTO: 74 %
NRBC BLD-RTO: 0 /100 WBCS (ref 0–0)
PLATELET # BLD AUTO: 290 X10*3/UL (ref 150–450)
POTASSIUM SERPL-SCNC: 4.2 MMOL/L (ref 3.5–5.3)
PROT SERPL-MCNC: 6.6 G/DL (ref 6.4–8.2)
RBC # BLD AUTO: 3.86 X10*6/UL (ref 4–5.2)
SODIUM SERPL-SCNC: 138 MMOL/L (ref 136–145)
WBC # BLD AUTO: 4.2 X10*3/UL (ref 4.4–11.3)

## 2025-08-06 PROCEDURE — 99214 OFFICE O/P EST MOD 30 MIN: CPT | Performed by: INTERNAL MEDICINE

## 2025-08-06 PROCEDURE — 96375 TX/PRO/DX INJ NEW DRUG ADDON: CPT | Mod: INF

## 2025-08-06 PROCEDURE — 80053 COMPREHEN METABOLIC PANEL: CPT

## 2025-08-06 PROCEDURE — 2500000004 HC RX 250 GENERAL PHARMACY W/ HCPCS (ALT 636 FOR OP/ED): Performed by: INTERNAL MEDICINE

## 2025-08-06 PROCEDURE — 96413 CHEMO IV INFUSION 1 HR: CPT

## 2025-08-06 PROCEDURE — 96417 CHEMO IV INFUS EACH ADDL SEQ: CPT

## 2025-08-06 PROCEDURE — 85025 COMPLETE CBC W/AUTO DIFF WBC: CPT

## 2025-08-06 PROCEDURE — 2500000001 HC RX 250 WO HCPCS SELF ADMINISTERED DRUGS (ALT 637 FOR MEDICARE OP): Performed by: INTERNAL MEDICINE

## 2025-08-06 PROCEDURE — 3008F BODY MASS INDEX DOCD: CPT | Performed by: INTERNAL MEDICINE

## 2025-08-06 RX ORDER — PROCHLORPERAZINE EDISYLATE 5 MG/ML
10 INJECTION INTRAMUSCULAR; INTRAVENOUS EVERY 6 HOURS PRN
OUTPATIENT
Start: 2025-08-20

## 2025-08-06 RX ORDER — DIPHENHYDRAMINE HCL 50 MG
50 CAPSULE ORAL ONCE
OUTPATIENT
Start: 2025-08-13

## 2025-08-06 RX ORDER — FAMOTIDINE 10 MG/ML
20 INJECTION, SOLUTION INTRAVENOUS ONCE
Status: COMPLETED | OUTPATIENT
Start: 2025-08-06 | End: 2025-08-06

## 2025-08-06 RX ORDER — EPINEPHRINE 0.3 MG/.3ML
0.3 INJECTION SUBCUTANEOUS EVERY 5 MIN PRN
OUTPATIENT
Start: 2025-08-13

## 2025-08-06 RX ORDER — EPINEPHRINE 0.3 MG/.3ML
0.3 INJECTION SUBCUTANEOUS EVERY 5 MIN PRN
Status: DISCONTINUED | OUTPATIENT
Start: 2025-08-06 | End: 2025-08-06 | Stop reason: HOSPADM

## 2025-08-06 RX ORDER — ALBUTEROL SULFATE 0.83 MG/ML
3 SOLUTION RESPIRATORY (INHALATION) AS NEEDED
OUTPATIENT
Start: 2025-08-13

## 2025-08-06 RX ORDER — PALONOSETRON 0.05 MG/ML
0.25 INJECTION, SOLUTION INTRAVENOUS ONCE
Status: COMPLETED | OUTPATIENT
Start: 2025-08-06 | End: 2025-08-06

## 2025-08-06 RX ORDER — PROCHLORPERAZINE MALEATE 10 MG
10 TABLET ORAL EVERY 6 HOURS PRN
OUTPATIENT
Start: 2025-08-13

## 2025-08-06 RX ORDER — FAMOTIDINE 10 MG/ML
20 INJECTION, SOLUTION INTRAVENOUS ONCE
OUTPATIENT
Start: 2025-08-27

## 2025-08-06 RX ORDER — FAMOTIDINE 10 MG/ML
20 INJECTION, SOLUTION INTRAVENOUS ONCE AS NEEDED
OUTPATIENT
Start: 2025-08-20

## 2025-08-06 RX ORDER — DIPHENHYDRAMINE HYDROCHLORIDE 50 MG/ML
50 INJECTION, SOLUTION INTRAMUSCULAR; INTRAVENOUS AS NEEDED
OUTPATIENT
Start: 2025-08-13

## 2025-08-06 RX ORDER — DIPHENHYDRAMINE HCL 50 MG
50 CAPSULE ORAL ONCE
OUTPATIENT
Start: 2025-08-27

## 2025-08-06 RX ORDER — FAMOTIDINE 10 MG/ML
20 INJECTION, SOLUTION INTRAVENOUS ONCE
OUTPATIENT
Start: 2025-08-13

## 2025-08-06 RX ORDER — FAMOTIDINE 10 MG/ML
20 INJECTION, SOLUTION INTRAVENOUS ONCE AS NEEDED
OUTPATIENT
Start: 2025-08-13

## 2025-08-06 RX ORDER — DIPHENHYDRAMINE HYDROCHLORIDE 50 MG/ML
50 INJECTION, SOLUTION INTRAMUSCULAR; INTRAVENOUS AS NEEDED
OUTPATIENT
Start: 2025-08-27

## 2025-08-06 RX ORDER — ALBUTEROL SULFATE 0.83 MG/ML
3 SOLUTION RESPIRATORY (INHALATION) AS NEEDED
OUTPATIENT
Start: 2025-08-27

## 2025-08-06 RX ORDER — PROCHLORPERAZINE MALEATE 10 MG
10 TABLET ORAL EVERY 6 HOURS PRN
OUTPATIENT
Start: 2025-08-20

## 2025-08-06 RX ORDER — HEPARIN SODIUM,PORCINE/PF 10 UNIT/ML
50 SYRINGE (ML) INTRAVENOUS AS NEEDED
OUTPATIENT
Start: 2025-08-06

## 2025-08-06 RX ORDER — PROCHLORPERAZINE MALEATE 10 MG
10 TABLET ORAL EVERY 6 HOURS PRN
OUTPATIENT
Start: 2025-08-27

## 2025-08-06 RX ORDER — HEPARIN 100 UNIT/ML
500 SYRINGE INTRAVENOUS AS NEEDED
Status: DISCONTINUED | OUTPATIENT
Start: 2025-08-06 | End: 2025-08-06 | Stop reason: HOSPADM

## 2025-08-06 RX ORDER — EPINEPHRINE 0.3 MG/.3ML
0.3 INJECTION SUBCUTANEOUS EVERY 5 MIN PRN
OUTPATIENT
Start: 2025-08-20

## 2025-08-06 RX ORDER — DIPHENHYDRAMINE HCL 50 MG
50 CAPSULE ORAL ONCE
OUTPATIENT
Start: 2025-08-20

## 2025-08-06 RX ORDER — PALONOSETRON 0.05 MG/ML
0.25 INJECTION, SOLUTION INTRAVENOUS ONCE
OUTPATIENT
Start: 2025-08-13

## 2025-08-06 RX ORDER — PROCHLORPERAZINE MALEATE 10 MG
10 TABLET ORAL EVERY 6 HOURS PRN
Status: DISCONTINUED | OUTPATIENT
Start: 2025-08-06 | End: 2025-08-06 | Stop reason: HOSPADM

## 2025-08-06 RX ORDER — PROCHLORPERAZINE EDISYLATE 5 MG/ML
10 INJECTION INTRAMUSCULAR; INTRAVENOUS EVERY 6 HOURS PRN
OUTPATIENT
Start: 2025-08-27

## 2025-08-06 RX ORDER — FAMOTIDINE 10 MG/ML
20 INJECTION, SOLUTION INTRAVENOUS ONCE
OUTPATIENT
Start: 2025-08-20

## 2025-08-06 RX ORDER — FAMOTIDINE 10 MG/ML
20 INJECTION, SOLUTION INTRAVENOUS ONCE AS NEEDED
Status: DISCONTINUED | OUTPATIENT
Start: 2025-08-06 | End: 2025-08-06 | Stop reason: HOSPADM

## 2025-08-06 RX ORDER — PROCHLORPERAZINE EDISYLATE 5 MG/ML
10 INJECTION INTRAMUSCULAR; INTRAVENOUS EVERY 6 HOURS PRN
OUTPATIENT
Start: 2025-08-13

## 2025-08-06 RX ORDER — DIPHENHYDRAMINE HYDROCHLORIDE 50 MG/ML
50 INJECTION, SOLUTION INTRAMUSCULAR; INTRAVENOUS AS NEEDED
OUTPATIENT
Start: 2025-08-20

## 2025-08-06 RX ORDER — PALONOSETRON 0.05 MG/ML
0.25 INJECTION, SOLUTION INTRAVENOUS ONCE
OUTPATIENT
Start: 2025-08-20

## 2025-08-06 RX ORDER — DIPHENHYDRAMINE HCL 50 MG
50 CAPSULE ORAL ONCE
Status: COMPLETED | OUTPATIENT
Start: 2025-08-06 | End: 2025-08-06

## 2025-08-06 RX ORDER — EPINEPHRINE 0.3 MG/.3ML
0.3 INJECTION SUBCUTANEOUS EVERY 5 MIN PRN
OUTPATIENT
Start: 2025-08-27

## 2025-08-06 RX ORDER — DIPHENHYDRAMINE HYDROCHLORIDE 50 MG/ML
50 INJECTION, SOLUTION INTRAMUSCULAR; INTRAVENOUS AS NEEDED
Status: DISCONTINUED | OUTPATIENT
Start: 2025-08-06 | End: 2025-08-06 | Stop reason: HOSPADM

## 2025-08-06 RX ORDER — ALBUTEROL SULFATE 0.83 MG/ML
3 SOLUTION RESPIRATORY (INHALATION) AS NEEDED
OUTPATIENT
Start: 2025-08-20

## 2025-08-06 RX ORDER — FAMOTIDINE 10 MG/ML
20 INJECTION, SOLUTION INTRAVENOUS ONCE AS NEEDED
OUTPATIENT
Start: 2025-08-27

## 2025-08-06 RX ORDER — HEPARIN 100 UNIT/ML
500 SYRINGE INTRAVENOUS AS NEEDED
OUTPATIENT
Start: 2025-08-06

## 2025-08-06 RX ORDER — ALBUTEROL SULFATE 0.83 MG/ML
3 SOLUTION RESPIRATORY (INHALATION) AS NEEDED
Status: DISCONTINUED | OUTPATIENT
Start: 2025-08-06 | End: 2025-08-06 | Stop reason: HOSPADM

## 2025-08-06 RX ORDER — PALONOSETRON 0.05 MG/ML
0.25 INJECTION, SOLUTION INTRAVENOUS ONCE
OUTPATIENT
Start: 2025-08-27

## 2025-08-06 RX ORDER — PROCHLORPERAZINE EDISYLATE 5 MG/ML
10 INJECTION INTRAMUSCULAR; INTRAVENOUS EVERY 6 HOURS PRN
Status: DISCONTINUED | OUTPATIENT
Start: 2025-08-06 | End: 2025-08-06 | Stop reason: HOSPADM

## 2025-08-06 RX ADMIN — FAMOTIDINE 20 MG: 10 INJECTION INTRAVENOUS at 10:07

## 2025-08-06 RX ADMIN — PACLITAXEL 132 MG: 6 INJECTION, SOLUTION INTRAVENOUS at 10:43

## 2025-08-06 RX ADMIN — CARBOPLATIN 150 MG: 10 INJECTION, SOLUTION INTRAVENOUS at 11:54

## 2025-08-06 RX ADMIN — HEPARIN 500 UNITS: 100 SYRINGE at 14:11

## 2025-08-06 RX ADMIN — DEXAMETHASONE SODIUM PHOSPHATE 12 MG: 10 INJECTION, SOLUTION INTRAMUSCULAR; INTRAVENOUS at 10:07

## 2025-08-06 RX ADMIN — PALONOSETRON HYDROCHLORIDE 0.25 MG: 0.25 INJECTION INTRAVENOUS at 10:07

## 2025-08-06 RX ADMIN — DIPHENHYDRAMINE HYDROCHLORIDE 50 MG: 50 CAPSULE ORAL at 10:07

## 2025-08-06 ASSESSMENT — PAIN SCALES - GENERAL: PAINLEVEL_OUTOF10: 0-NO PAIN

## 2025-08-06 NOTE — PROGRESS NOTES
Patient Visit Information:   Date of Service: 08/09/2025   Referring Provider: Dr. Anita Le  Visit Type: Follow-up Visit      Cancer History:          Breast         AJCC Edition: 8th (AJCC), Diagnosis Date: 6/16/2025     Treatment Synopsis:    59 y.o. 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  The patient's breast cancer was diagnosed on June 16, 2025, and is grade 3, Triple Negative, estrogen receptor <1%, progesterone receptor <1% and HER-2/cinthia negative.        ONCOLOGIC HISTORY:     CURRENT THERAPY: KEYNOTE 522   Cancer Staging   Malignant neoplasm of upper-inner quadrant of left breast in female, estrogen receptor negative  Staging form: Breast, AJCC 8th Edition  - Clinical stage from 6/16/2025: Stage IIB - Signed by Madiha Driscoll MD on 7/9/2025  cT2  cN0  cM0  Damon grade: G3  ER Status: Negative  DE Status: Negative  HER2 Status: Negative     Details of her history are as follows:     5/31/25: Patient underwent bilateral screening mammogram which demonstrated extremely dense breast tissue, BI-RADS Category 0, A focal asymmetry with associated calcifications is seen in central left breast posteriorly.  No suspicious masses or calcifications were identified in the right breast.  6/6/25:  Patient underwent a  left diagnostic mammogram and targeted ultrasound, BI-RADS Category 4, This revealed that previously seen focal area of mass asymmetry with associated calcifications within the upper inner quadrant of the left breast persisted. US revealed a mass at 11:00, 9 cm from the nipple measuring approximately 17 mm. No suspicious lymph nodes were seen.  6/16/25: Patient underwent an US-guided core needle biopsy of the left breast, 11:00 9cm from nipple. Pathology was consistent with above  6/25/2025: Patient underwent an MRI of the breast. This revealed an irregular rim-enhancing mass in  the        superomedial breast at posterior depth corresponding to the site of biopsy proven malignancy.  It measured        2.3 x 1.9 x 2.1 cm and non mass enhancement along the superior and inferior medial aspect of the mass.  In        total the irregular mass and non mass enhancement measured up to 5.0 x 2.5 cm. There is a prominent level 1         axillary lymph node with cortical thickness of 0.4 cm. There is an indeterminate internal mammary lymph node        measuring 0.5 cm   07/03/2025: Patient underwent an US guided core biopsy of left axilla LN. Path results results were negative for carcinoma  07/08/2025: Patient underwent a MRI guided biopsy of non-mass enhancement. Path results were consistent with DCIS  07/23/2025: Received C1 of KEYNOTE 522    CURRENT THERAPY: Neoadjuvant KEYNOTE 522         History of Present Illness: Patient ID: Yudith Almeida is a 59 y.o. female.        Chief Complaint: Here for pre-chemo evaluation. Today is C1D15      Interval History:    Ms. Almeida is a pleasant 59 y.o. 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  The patient's breast cancer was diagnosed on June 16, 2025, and is grade 3, Triple Negative, estrogen receptor <1%, progesterone receptor <1% and HER-2/cinthia negative.     She comes in accompanied by her .      In the interim since her last visit, patient has since received week 1 and 2 doses of Carboplatin and Taxol as well as Pembrolizumab on D1. Thus far she appears to have tolerated chemotherapy well.     She has no complaints today.         Review of Systems:      A 14-point review of system was completed and was negative except for what is noted in HPI.         Allergies and Intolerances:       Allergies: [Allergies]     [Allergies]          Allergen Reactions    Amoxicillin Rash                 Outpatient Medication Profile: [Current  Medications]     [Current Medications]              Current Outpatient Medications   Medication Sig Dispense Refill    MAGNESIUM ORAL Take by mouth once daily.        multivitamin tablet Take 1 tablet by mouth once daily.        ASCORBIC ACID, VITAMIN C, ORAL Take by mouth once daily. (Patient not taking: Reported on 7/8/2025)        BIOTIN ORAL Take by mouth. (Patient not taking: Reported on 12/2/2024)        calcium/magnesium/capsai/gingr (CA CARB-MAGNESIUM-CAPS-RANDI ORAL) Take by mouth. (Patient not taking: Reported on 7/8/2025)        cholecalciferol (Vitamin D-3) 5,000 Units tablet Take by mouth once daily. (Patient not taking: Reported on 6/16/2025)        tretinoin microspheres (Retin-A Micro Pump) 0.08 % gel with pump Apply to face at bedtime as directed (Patient not taking: Reported on 7/8/2025) 50 g 3      No current facility-administered medications for this visit.               Medical History:  [Medical History]     [Medical History]  Past Medical History          Diagnosis Date    Breast cancer June 7 2025    Other specified health status 04/17/2014     No pertinent past medical history    Personal history of malignant neoplasm of other organs and systems       History of squamous cell carcinoma    Personal history of other diseases of the respiratory system 10/28/2018     History of sore throat    Personal history of other infectious and parasitic diseases       History of varicella      Surgical History: [Surgical History]     [Surgical History]  Past Surgical History            Procedure Laterality Date    ANTERIOR CRUCIATE LIGAMENT REPAIR   04/17/2014     Primary Repair Of Knee Ligament Cruciate Anterior    BREAST BIOPSY   June 2 2025    KNEE SURGERY   04/17/2014     Knee Surgery    MOUTH SURGERY   04/17/2014     Oral Surgery Tooth Extraction    OTHER SURGICAL HISTORY   04/17/2014     Knee Arthroscopy With Medial Collateral Ligament Repair    OTHER SURGICAL HISTORY   01/20/2021     Mass excision     OTHER SURGICAL HISTORY   03/14/2016     History Of Prior Surgery      Family History: [Family History]    [Family History]              Problem Relation Name Age of Onset    Lung cancer Mother        Prostate cancer Father        Cancer Mother Rosangela Gaytan lung 60 - 69      Family Oncology History:  Cancer-related family history includes Cancer (age of onset: 60 - 69) in her mother; Lung cancer in her mother; Prostate cancer in her father.   Social Substance History:   Social History                   Socioeconomic History    Marital status:        Spouse name: Not on file    Number of children: Not on file    Years of education: Not on file    Highest education level: Not on file   Occupational History    Not on file   Tobacco Use    Smoking status: Never    Smokeless tobacco: Never   Substance and Sexual Activity    Alcohol use: Not Currently       Alcohol/week: 6.0 standard drinks of alcohol       Types: 2 Glasses of wine, 4 Cans of beer per week       Comment: No drinks after i felt lump    Drug use: Never    Sexual activity: Not Currently       Partners: Male       Birth control/protection: Abstinence, Post-menopausal   Other Topics Concern    Not on file   Social History Narrative    Not on file      Social Drivers of Health              Financial Resource Strain: Low Risk  (7/3/2025)     Overall Financial Resource Strain (CARDIA)      Difficulty of Paying Living Expenses: Not hard at all   Food Insecurity: No Food Insecurity (7/3/2025)     Hunger Vital Sign      Worried About Running Out of Food in the Last Year: Never true      Ran Out of Food in the Last Year: Never true   Transportation Needs: No Transportation Needs (7/3/2025)     PRAPARE - Transportation      Lack of Transportation (Medical): No      Lack of Transportation (Non-Medical): No   Physical Activity: Sufficiently Active (7/3/2025)     Exercise Vital Sign      Days of Exercise per Week: 5 days      Minutes of Exercise per Session:  "30 min   Stress: No Stress Concern Present (7/3/2025)     [employer] - Occupational Stress Questionnaire      Feeling of Stress : Not at all   Social Connections: Moderately Isolated (7/3/2025)     Social Connection and Isolation Panel [NHANES]      Frequency of Communication with Friends and Family: More than three times a week      Frequency of Social Gatherings with Friends and Family: Once a week      Attends Congregational Services: Never      Active Member of Clubs or Organizations: No      Attends Club or Organization Meetings: Never      Marital Status:    Intimate Partner Violence: Not At Risk (7/3/2025)     Humiliation, Afraid, Rape, and Kick questionnaire      Fear of Current or Ex-Partner: No      Emotionally Abused: No      Physically Abused: No      Sexually Abused: No   Housing Stability: Low Risk  (7/3/2025)     Housing Stability Vital Sign      Unable to Pay for Housing in the Last Year: No      Number of Times Moved in the Last Year: 0      Homeless in the Last Year: No         Additional Social History:     Breast Cancer Risk Factors:   REPRODUCTIVE HEALTH: menarche at 16, post-menopausal at 55, , with no hormone exposure such as birth control pills or post menopausal estrogen     OBJECTIVE:     Visit Vitals  /73   Pulse 74   Temp 36.4 °C (97.5 °F) (Temporal)   Resp 16   Ht 1.626 m (5' 4.02\")   Wt 58.1 kg (128 lb 1.4 oz)   SpO2 96%   BMI 21.98 kg/m²   OB Status Postmenopausal   Smoking Status Never   BSA 1.62 m²       Pain Scale: 0     The ECOG performance scale today is Asymptomatic  Physical Exam:      Constitutional: Well developed, awake/alert/oriented  x3, no distress, alert and cooperative   Eyes: PERRL, EOMI, clear sclera   ENMT: mucous membranes moist, no apparent injury,  no lesions seen   Head/Neck: Neck supple, no apparent injury, thyroid  without mass or tenderness, No JVD, trachea midline, no bruits   Respiratory/Thorax: Patent airways, CTAB, " normal  breath sounds with good chest expansion, thorax symmetric   Cardiovascular: Regular, rate and rhythm, no murmurs,  2+ equal pulses of the extremities, normal S 1and S 2   Gastrointestinal: Nondistended, soft, non-tender,  no rebound tenderness or guarding, no masses palpable, no organomegaly, +BS, no bruits   Musculoskeletal: ROM intact, no joint swelling, normal  strength   Extremities: Normal. No abnormalities detected   Neurological: alert and oriented x3, intact senses,  motor, response and reflexes, normal strength   Breast: Breast exam: Palpable mass in the at UIQ of left breast previously measuring 2-3 cm, now smaller No palpable mass in right breast. No palpable axillary or supraclavicular lymphadenopathies bilaterally. No swelling of either upper extremity which had free range of motion.     Lymphatic: No significant lymphadenopathy except as noted above   Psychological: Appropriate mood and behavior   Skin: Warm and dry, no lesions, no rashes          DIAGNOSTICRESULTSBEGIN@  MR breast bilateral w contrast full protocol  Status: Final result      PACS Images      Show images for MR breast bilateral w contrast full protocol  Signed by     Signed Time Phone Pager   Claire Pritchett MD 6/26/2025 13:02 [phone]      Exam Information     Status Exam Begun Exam Ended   Final 6/25/2025 07:49 6/25/2025 08:19      Study Result     Narrative & Impression   Interpreted By:  Claire Pritchett,   STUDY:  BI MR BREAST BILATERAL WITH CONTRAST FULL PROTOCOL;  6/25/2025 8:19 am      ACCESSION NUMBER(S):  [id number]      ORDERING CLINICIAN:  MICHELLE ZHENG      INDICATION:  Newly diagnosed left breast cancer      ,C50.212 Malignant neoplasm of upper-inner quadrant of left female  breast,Z17.1 Estrogen receptor negative status (ER-)      COMPARISON:  Mammogram and ultrasound 06/06/2025      TECHNIQUE:  Using a dedicated breast coil, STIR axial and T1-weighted fat  saturation axial images of the breasts were  obtained, the latter both  before and after intravenous administration of Gadolinium DTPA. On an  independent workstation, 3-D images were formulated using [employer]  including time enhancement curves, subtraction images and MIP images.      Intravenous contrast: 12 ML of Dotarem      FINDINGS:  Density: Extreme fibroglandular tissue.      There is symmetric minimal bilateral background enhancement.      RIGHT BREAST:  No suspicious mass or nonmass enhancement is  identified.      No axillary or internal mammary lymphadenopathy is appreciated.      LEFT BREAST: There is an irregular rim-enhancing mass in the  superomedial breast at posterior depth corresponding to the site of  biopsy proven malignancy (subtraction image 66/166). It measures 2.3  x 1.9 x 2.1 cm (trans by AP by CC). Signal void from the there is  clumped non mass enhancement along the superior and inferior medial  aspect of the mass (subtraction image 58-83/166). In total the  irregular mass and non mass enhancement measure up to 5.0 x 2.5 cm  (CC x AP). This is appreciated on a saved sagittal image. There is no  evidence of extension to scan or underlying pectoralis muscle of      There is a prominent level 1 axillary lymph node with cortical  thickness of 0.4 cm (series 7 image 40/166). There is an  indeterminate internal mammary lymph node measuring 0.5 cm (series 7,  image 46/166).      NON-BREAST FINDINGS:  None.      IMPRESSION:  Biopsy proven malignant left breast mass with adjacent clumped non  mass enhancement, in total spanning up to 5.0 cm. MRI guided biopsy  of the most anterior inferior aspect of the non-mass enhancement is  recommended if this would alter surgical management.      Left axillary lymph node with prominent cortical thickness, as well  as an indeterminate left internal mammary lymph node. MRI-directed  ultrasound of both finding is recommended. Biopsy may be performed if  indicated.      No MRI evidence of malignancy in the  right breast.      BI-RADS CATEGORY:  BI-RADS Category:  4 Suspicious.  Recommendation:  Surgical Consultation and Biopsy.  Recommended Date:  Immediate.  Laterality:  Left.      For any future breast imaging appointments, please call [phone]).          MACRO:  Critical Finding:  See findings. Notification was initiated on  6/26/2025 at 1:01 pm by Dr. Claire Pritchett.  (**-YCF-**)  Instructions:  Surgical Consultation and Imaging Guided Biopsy.      Signed by: Claire Pritchett 6/26/2025 1:02 PM  Dictation workstation:   [id number]         No images are attached to the encounter.     @LABORATORY DATA@            Lab Results     Lab Results   Component Value Date    WBC 4.2 (L) 08/06/2025    HGB 12.5 08/06/2025    HCT 36.4 08/06/2025    MCV 94 08/06/2025     08/06/2025        Lab Results   Component Value Date    NEUTROABS 3.07 08/06/2025          Chemistry    Lab Results   Component Value Date/Time     08/06/2025 0901    K 4.2 08/06/2025 0901     08/06/2025 0901    CO2 26 08/06/2025 0901    BUN 16 08/06/2025 0901    CREATININE 0.76 08/06/2025 0901    Lab Results   Component Value Date/Time    CALCIUM 9.0 08/06/2025 0901    ALKPHOS 37 08/06/2025 0901    AST 13 08/06/2025 0901    ALT 13 08/06/2025 0901    BILITOT 0.4 08/06/2025 0901            Assessment and Plan:   Assessment  Ms. Almeida is a pleasant 59 y.o. 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  The patient's breast cancer was diagnosed on June 16, 2025, and is grade 3, Triple Negative, estrogen receptor <1%, progesterone receptor <1% and HER-2/cinthia negative.     She comes in accompanied by her  for neoadjuvant treatment consideration.      Given that tumor size is at least 2.3 cm and may actually be greater than 5 cm she will benefit from neoadjuvant chemotherapy with Taxol, Carboplatin, Adriamycin and  Cyclophosphamide given concurrently with Pembrolizumab (KEYNOTE 522).         Side effects that were discussed included but were not limited to myelosuppression, febrile neutropenia, nausea, vomiting diarrhea, dehydration,  acute hypersensitivity reaction, alopecia, peripheral neuropathy, liver and kidney toxicity, cardiotoxicity,  other malignancies and peripheral edema.     After much deliberation patient agreed to proceed with treatment. Informed consent was signed.     Plan       Proceed with C1D15 dose of Carbo an Taxol  [facility] 09/02 for  Cycle 3 of Taxol Carbo and Pembrolizumab

## 2025-08-06 NOTE — PROGRESS NOTES
Patient presents to infusion appointment from clinic in stable condition C1D15 paclitaxel/ carboplatin + cold cap. Treatment tolerated without incident. Discharged in stable condition at end of 90 min post treatment cooling.

## 2025-08-07 ENCOUNTER — TELEPHONE (OUTPATIENT)
Dept: HEMATOLOGY/ONCOLOGY | Facility: HOSPITAL | Age: 60
End: 2025-08-07

## 2025-08-12 ENCOUNTER — NURSE TRIAGE (OUTPATIENT)
Dept: ADMISSION | Facility: HOSPITAL | Age: 60
End: 2025-08-12
Payer: COMMERCIAL

## 2025-08-13 ENCOUNTER — NUTRITION (OUTPATIENT)
Dept: HEMATOLOGY/ONCOLOGY | Facility: HOSPITAL | Age: 60
End: 2025-08-13
Payer: COMMERCIAL

## 2025-08-13 ENCOUNTER — INFUSION (OUTPATIENT)
Dept: HEMATOLOGY/ONCOLOGY | Facility: HOSPITAL | Age: 60
End: 2025-08-13
Payer: COMMERCIAL

## 2025-08-13 VITALS
WEIGHT: 128.9 LBS | TEMPERATURE: 97.3 F | SYSTOLIC BLOOD PRESSURE: 120 MMHG | BODY MASS INDEX: 22.11 KG/M2 | DIASTOLIC BLOOD PRESSURE: 69 MMHG | RESPIRATION RATE: 17 BRPM | OXYGEN SATURATION: 100 % | HEART RATE: 62 BPM

## 2025-08-13 DIAGNOSIS — Z17.1 MALIGNANT NEOPLASM OF UPPER-INNER QUADRANT OF LEFT BREAST IN FEMALE, ESTROGEN RECEPTOR NEGATIVE: ICD-10-CM

## 2025-08-13 DIAGNOSIS — C50.212 MALIGNANT NEOPLASM OF UPPER-INNER QUADRANT OF LEFT BREAST IN FEMALE, ESTROGEN RECEPTOR NEGATIVE: ICD-10-CM

## 2025-08-13 LAB
ALBUMIN SERPL BCP-MCNC: 4.4 G/DL (ref 3.4–5)
ALP SERPL-CCNC: 40 U/L (ref 33–110)
ALT SERPL W P-5'-P-CCNC: 15 U/L (ref 7–45)
ANION GAP SERPL CALC-SCNC: 11 MMOL/L (ref 10–20)
AST SERPL W P-5'-P-CCNC: 17 U/L (ref 9–39)
BASOPHILS # BLD AUTO: 0.03 X10*3/UL (ref 0–0.1)
BASOPHILS NFR BLD AUTO: 0.7 %
BILIRUB SERPL-MCNC: 0.4 MG/DL (ref 0–1.2)
BUN SERPL-MCNC: 17 MG/DL (ref 6–23)
CALCIUM SERPL-MCNC: 9.3 MG/DL (ref 8.6–10.3)
CHLORIDE SERPL-SCNC: 104 MMOL/L (ref 98–107)
CO2 SERPL-SCNC: 27 MMOL/L (ref 21–32)
CREAT SERPL-MCNC: 0.82 MG/DL (ref 0.5–1.05)
EGFRCR SERPLBLD CKD-EPI 2021: 83 ML/MIN/1.73M*2
EOSINOPHIL # BLD AUTO: 0.01 X10*3/UL (ref 0–0.7)
EOSINOPHIL NFR BLD AUTO: 0.2 %
ERYTHROCYTE [DISTWIDTH] IN BLOOD BY AUTOMATED COUNT: 12.6 % (ref 11.5–14.5)
GLUCOSE SERPL-MCNC: 96 MG/DL (ref 74–99)
HCT VFR BLD AUTO: 36.2 % (ref 36–46)
HGB BLD-MCNC: 12.3 G/DL (ref 12–16)
IMM GRANULOCYTES # BLD AUTO: 0.02 X10*3/UL (ref 0–0.7)
IMM GRANULOCYTES NFR BLD AUTO: 0.5 % (ref 0–0.9)
LYMPHOCYTES # BLD AUTO: 0.85 X10*3/UL (ref 1.2–4.8)
LYMPHOCYTES NFR BLD AUTO: 20.6 %
MCH RBC QN AUTO: 31.9 PG (ref 26–34)
MCHC RBC AUTO-ENTMCNC: 34 G/DL (ref 32–36)
MCV RBC AUTO: 94 FL (ref 80–100)
MONOCYTES # BLD AUTO: 0.46 X10*3/UL (ref 0.1–1)
MONOCYTES NFR BLD AUTO: 11.2 %
NEUTROPHILS # BLD AUTO: 2.75 X10*3/UL (ref 1.2–7.7)
NEUTROPHILS NFR BLD AUTO: 66.8 %
NRBC BLD-RTO: 0 /100 WBCS (ref 0–0)
PLATELET # BLD AUTO: 235 X10*3/UL (ref 150–450)
POTASSIUM SERPL-SCNC: 4.2 MMOL/L (ref 3.5–5.3)
PROT SERPL-MCNC: 6.8 G/DL (ref 6.4–8.2)
RBC # BLD AUTO: 3.85 X10*6/UL (ref 4–5.2)
SODIUM SERPL-SCNC: 138 MMOL/L (ref 136–145)
WBC # BLD AUTO: 4.1 X10*3/UL (ref 4.4–11.3)

## 2025-08-13 PROCEDURE — 2500000001 HC RX 250 WO HCPCS SELF ADMINISTERED DRUGS (ALT 637 FOR MEDICARE OP): Performed by: INTERNAL MEDICINE

## 2025-08-13 PROCEDURE — 96375 TX/PRO/DX INJ NEW DRUG ADDON: CPT | Mod: INF

## 2025-08-13 PROCEDURE — 2500000004 HC RX 250 GENERAL PHARMACY W/ HCPCS (ALT 636 FOR OP/ED): Performed by: INTERNAL MEDICINE

## 2025-08-13 PROCEDURE — 96417 CHEMO IV INFUS EACH ADDL SEQ: CPT

## 2025-08-13 PROCEDURE — 84075 ASSAY ALKALINE PHOSPHATASE: CPT

## 2025-08-13 PROCEDURE — 85025 COMPLETE CBC W/AUTO DIFF WBC: CPT

## 2025-08-13 PROCEDURE — 96413 CHEMO IV INFUSION 1 HR: CPT

## 2025-08-13 RX ORDER — DIPHENHYDRAMINE HYDROCHLORIDE 50 MG/ML
50 INJECTION, SOLUTION INTRAMUSCULAR; INTRAVENOUS AS NEEDED
Status: DISCONTINUED | OUTPATIENT
Start: 2025-08-13 | End: 2025-08-13 | Stop reason: HOSPADM

## 2025-08-13 RX ORDER — PALONOSETRON 0.05 MG/ML
0.25 INJECTION, SOLUTION INTRAVENOUS ONCE
Status: COMPLETED | OUTPATIENT
Start: 2025-08-13 | End: 2025-08-13

## 2025-08-13 RX ORDER — FAMOTIDINE 10 MG/ML
20 INJECTION, SOLUTION INTRAVENOUS ONCE
Status: COMPLETED | OUTPATIENT
Start: 2025-08-13 | End: 2025-08-13

## 2025-08-13 RX ORDER — HEPARIN 100 UNIT/ML
500 SYRINGE INTRAVENOUS AS NEEDED
OUTPATIENT
Start: 2025-08-13

## 2025-08-13 RX ORDER — FAMOTIDINE 10 MG/ML
20 INJECTION, SOLUTION INTRAVENOUS ONCE AS NEEDED
Status: DISCONTINUED | OUTPATIENT
Start: 2025-08-13 | End: 2025-08-13 | Stop reason: HOSPADM

## 2025-08-13 RX ORDER — HEPARIN SODIUM,PORCINE/PF 10 UNIT/ML
50 SYRINGE (ML) INTRAVENOUS AS NEEDED
Status: DISCONTINUED | OUTPATIENT
Start: 2025-08-13 | End: 2025-08-13 | Stop reason: HOSPADM

## 2025-08-13 RX ORDER — HEPARIN SODIUM,PORCINE/PF 10 UNIT/ML
50 SYRINGE (ML) INTRAVENOUS AS NEEDED
OUTPATIENT
Start: 2025-08-13

## 2025-08-13 RX ORDER — PROCHLORPERAZINE MALEATE 10 MG
10 TABLET ORAL EVERY 6 HOURS PRN
Status: DISCONTINUED | OUTPATIENT
Start: 2025-08-13 | End: 2025-08-13 | Stop reason: HOSPADM

## 2025-08-13 RX ORDER — DIPHENHYDRAMINE HCL 50 MG
50 CAPSULE ORAL ONCE
Status: COMPLETED | OUTPATIENT
Start: 2025-08-13 | End: 2025-08-13

## 2025-08-13 RX ORDER — EPINEPHRINE 0.3 MG/.3ML
0.3 INJECTION SUBCUTANEOUS EVERY 5 MIN PRN
Status: DISCONTINUED | OUTPATIENT
Start: 2025-08-13 | End: 2025-08-13 | Stop reason: HOSPADM

## 2025-08-13 RX ORDER — HEPARIN 100 UNIT/ML
500 SYRINGE INTRAVENOUS AS NEEDED
Status: DISCONTINUED | OUTPATIENT
Start: 2025-08-13 | End: 2025-08-13 | Stop reason: HOSPADM

## 2025-08-13 RX ORDER — ALBUTEROL SULFATE 0.83 MG/ML
3 SOLUTION RESPIRATORY (INHALATION) AS NEEDED
Status: DISCONTINUED | OUTPATIENT
Start: 2025-08-13 | End: 2025-08-13 | Stop reason: HOSPADM

## 2025-08-13 RX ORDER — PROCHLORPERAZINE EDISYLATE 5 MG/ML
10 INJECTION INTRAMUSCULAR; INTRAVENOUS EVERY 6 HOURS PRN
Status: DISCONTINUED | OUTPATIENT
Start: 2025-08-13 | End: 2025-08-13 | Stop reason: HOSPADM

## 2025-08-13 RX ADMIN — CARBOPLATIN 140 MG: 600 INJECTION, SOLUTION INTRAVENOUS at 11:51

## 2025-08-13 RX ADMIN — HEPARIN 500 UNITS: 100 SYRINGE at 14:00

## 2025-08-13 RX ADMIN — PALONOSETRON HYDROCHLORIDE 0.25 MG: 0.25 INJECTION INTRAVENOUS at 09:26

## 2025-08-13 RX ADMIN — PACLITAXEL 132 MG: 6 INJECTION, SOLUTION INTRAVENOUS at 10:37

## 2025-08-13 RX ADMIN — DEXAMETHASONE SODIUM PHOSPHATE 12 MG: 10 INJECTION, SOLUTION INTRAMUSCULAR; INTRAVENOUS at 09:26

## 2025-08-13 RX ADMIN — SODIUM CHLORIDE 200 MG: 9 INJECTION, SOLUTION INTRAVENOUS at 10:02

## 2025-08-13 RX ADMIN — DIPHENHYDRAMINE HYDROCHLORIDE 50 MG: 50 CAPSULE ORAL at 09:26

## 2025-08-13 RX ADMIN — FAMOTIDINE 20 MG: 10 INJECTION INTRAVENOUS at 09:26

## 2025-08-13 ASSESSMENT — PAIN SCALES - GENERAL: PAINLEVEL_OUTOF10: 0-NO PAIN

## 2025-08-15 ENCOUNTER — APPOINTMENT (OUTPATIENT)
Dept: GENETICS | Facility: CLINIC | Age: 60
End: 2025-08-15
Payer: COMMERCIAL

## 2025-08-15 DIAGNOSIS — Z80.42 FAMILY HISTORY OF PROSTATE CANCER: ICD-10-CM

## 2025-08-15 DIAGNOSIS — Z71.83 ENCOUNTER FOR NONPROCREATIVE GENETIC COUNSELING: Primary | ICD-10-CM

## 2025-08-15 DIAGNOSIS — Z85.828 PERSONAL HISTORY OF SKIN CANCER: ICD-10-CM

## 2025-08-15 DIAGNOSIS — Z80.1 FAMILY HISTORY OF LUNG CANCER: ICD-10-CM

## 2025-08-15 DIAGNOSIS — C50.212 MALIGNANT NEOPLASM OF UPPER-INNER QUADRANT OF LEFT BREAST IN FEMALE, ESTROGEN RECEPTOR NEGATIVE: ICD-10-CM

## 2025-08-15 DIAGNOSIS — Z15.01 BRCA1 GENE MUTATION POSITIVE IN FEMALE: ICD-10-CM

## 2025-08-15 DIAGNOSIS — Z80.8 FAMILY HISTORY OF MELANOMA: ICD-10-CM

## 2025-08-15 DIAGNOSIS — Z17.1 MALIGNANT NEOPLASM OF UPPER-INNER QUADRANT OF LEFT BREAST IN FEMALE, ESTROGEN RECEPTOR NEGATIVE: ICD-10-CM

## 2025-08-15 DIAGNOSIS — Z15.09 BRCA1 GENE MUTATION POSITIVE IN FEMALE: ICD-10-CM

## 2025-08-15 DIAGNOSIS — Z80.8 FAMILY HISTORY OF NONMELANOMA SKIN CANCER: ICD-10-CM

## 2025-08-15 DIAGNOSIS — Z15.02 BRCA1 GENE MUTATION POSITIVE IN FEMALE: ICD-10-CM

## 2025-08-15 PROCEDURE — 96041 GENETIC COUNSELING SVC EA 30: CPT

## 2025-08-20 ENCOUNTER — INFUSION (OUTPATIENT)
Dept: HEMATOLOGY/ONCOLOGY | Facility: HOSPITAL | Age: 60
End: 2025-08-20
Payer: COMMERCIAL

## 2025-08-20 ENCOUNTER — NUTRITION (OUTPATIENT)
Dept: HEMATOLOGY/ONCOLOGY | Facility: HOSPITAL | Age: 60
End: 2025-08-20
Payer: COMMERCIAL

## 2025-08-20 VITALS
OXYGEN SATURATION: 100 % | BODY MASS INDEX: 22.17 KG/M2 | RESPIRATION RATE: 16 BRPM | DIASTOLIC BLOOD PRESSURE: 75 MMHG | SYSTOLIC BLOOD PRESSURE: 126 MMHG | HEART RATE: 56 BPM | WEIGHT: 129.2 LBS | TEMPERATURE: 97.2 F

## 2025-08-20 DIAGNOSIS — C50.212 MALIGNANT NEOPLASM OF UPPER-INNER QUADRANT OF LEFT BREAST IN FEMALE, ESTROGEN RECEPTOR NEGATIVE: ICD-10-CM

## 2025-08-20 DIAGNOSIS — Z17.1 MALIGNANT NEOPLASM OF UPPER-INNER QUADRANT OF LEFT BREAST IN FEMALE, ESTROGEN RECEPTOR NEGATIVE: ICD-10-CM

## 2025-08-20 LAB
ALBUMIN SERPL BCP-MCNC: 4.2 G/DL (ref 3.4–5)
ALP SERPL-CCNC: 43 U/L (ref 33–110)
ALT SERPL W P-5'-P-CCNC: 16 U/L (ref 7–45)
ANION GAP SERPL CALC-SCNC: 10 MMOL/L (ref 10–20)
AST SERPL W P-5'-P-CCNC: 14 U/L (ref 9–39)
BASOPHILS # BLD AUTO: 0.01 X10*3/UL (ref 0–0.1)
BASOPHILS NFR BLD AUTO: 0.3 %
BILIRUB SERPL-MCNC: 0.4 MG/DL (ref 0–1.2)
BUN SERPL-MCNC: 13 MG/DL (ref 6–23)
CALCIUM SERPL-MCNC: 9.2 MG/DL (ref 8.6–10.3)
CHLORIDE SERPL-SCNC: 105 MMOL/L (ref 98–107)
CHOLEST SERPL-MCNC: 166 MG/DL (ref 0–199)
CHOLESTEROL/HDL RATIO: 4.3
CO2 SERPL-SCNC: 28 MMOL/L (ref 21–32)
CREAT SERPL-MCNC: 0.75 MG/DL (ref 0.5–1.05)
EGFRCR SERPLBLD CKD-EPI 2021: >90 ML/MIN/1.73M*2
EOSINOPHIL # BLD AUTO: 0.01 X10*3/UL (ref 0–0.7)
EOSINOPHIL NFR BLD AUTO: 0.3 %
ERYTHROCYTE [DISTWIDTH] IN BLOOD BY AUTOMATED COUNT: 13.3 % (ref 11.5–14.5)
GLUCOSE SERPL-MCNC: 95 MG/DL (ref 74–99)
HCT VFR BLD AUTO: 35.9 % (ref 36–46)
HDLC SERPL-MCNC: 38.2 MG/DL
HGB BLD-MCNC: 12.2 G/DL (ref 12–16)
IMM GRANULOCYTES # BLD AUTO: 0.04 X10*3/UL (ref 0–0.7)
IMM GRANULOCYTES NFR BLD AUTO: 1.3 % (ref 0–0.9)
LDLC SERPL CALC-MCNC: 104 MG/DL
LYMPHOCYTES # BLD AUTO: 0.84 X10*3/UL (ref 1.2–4.8)
LYMPHOCYTES NFR BLD AUTO: 27.4 %
MCH RBC QN AUTO: 32 PG (ref 26–34)
MCHC RBC AUTO-ENTMCNC: 34 G/DL (ref 32–36)
MCV RBC AUTO: 94 FL (ref 80–100)
MONOCYTES # BLD AUTO: 0.33 X10*3/UL (ref 0.1–1)
MONOCYTES NFR BLD AUTO: 10.7 %
NEUTROPHILS # BLD AUTO: 1.84 X10*3/UL (ref 1.2–7.7)
NEUTROPHILS NFR BLD AUTO: 60 %
NON HDL CHOLESTEROL: 128 MG/DL (ref 0–149)
NRBC BLD-RTO: 0 /100 WBCS (ref 0–0)
PLATELET # BLD AUTO: 159 X10*3/UL (ref 150–450)
POTASSIUM SERPL-SCNC: 4 MMOL/L (ref 3.5–5.3)
PROT SERPL-MCNC: 6.5 G/DL (ref 6.4–8.2)
RBC # BLD AUTO: 3.81 X10*6/UL (ref 4–5.2)
SODIUM SERPL-SCNC: 139 MMOL/L (ref 136–145)
TRIGL SERPL-MCNC: 121 MG/DL (ref 0–149)
VLDL: 24 MG/DL (ref 0–40)
WBC # BLD AUTO: 3.1 X10*3/UL (ref 4.4–11.3)

## 2025-08-20 PROCEDURE — 96374 THER/PROPH/DIAG INJ IV PUSH: CPT | Mod: INF

## 2025-08-20 PROCEDURE — 96413 CHEMO IV INFUSION 1 HR: CPT

## 2025-08-20 PROCEDURE — 2500000001 HC RX 250 WO HCPCS SELF ADMINISTERED DRUGS (ALT 637 FOR MEDICARE OP): Performed by: INTERNAL MEDICINE

## 2025-08-20 PROCEDURE — 80061 LIPID PANEL: CPT | Performed by: INTERNAL MEDICINE

## 2025-08-20 PROCEDURE — 96417 CHEMO IV INFUS EACH ADDL SEQ: CPT

## 2025-08-20 PROCEDURE — 2500000004 HC RX 250 GENERAL PHARMACY W/ HCPCS (ALT 636 FOR OP/ED): Performed by: INTERNAL MEDICINE

## 2025-08-20 PROCEDURE — 80053 COMPREHEN METABOLIC PANEL: CPT

## 2025-08-20 PROCEDURE — 96375 TX/PRO/DX INJ NEW DRUG ADDON: CPT | Mod: INF

## 2025-08-20 PROCEDURE — 85025 COMPLETE CBC W/AUTO DIFF WBC: CPT

## 2025-08-20 RX ORDER — ALBUTEROL SULFATE 0.83 MG/ML
3 SOLUTION RESPIRATORY (INHALATION) AS NEEDED
Status: DISCONTINUED | OUTPATIENT
Start: 2025-08-20 | End: 2025-08-20 | Stop reason: HOSPADM

## 2025-08-20 RX ORDER — FAMOTIDINE 10 MG/ML
20 INJECTION, SOLUTION INTRAVENOUS ONCE
Status: COMPLETED | OUTPATIENT
Start: 2025-08-20 | End: 2025-08-20

## 2025-08-20 RX ORDER — FAMOTIDINE 10 MG/ML
20 INJECTION, SOLUTION INTRAVENOUS ONCE AS NEEDED
Status: DISCONTINUED | OUTPATIENT
Start: 2025-08-20 | End: 2025-08-20 | Stop reason: HOSPADM

## 2025-08-20 RX ORDER — HEPARIN 100 UNIT/ML
500 SYRINGE INTRAVENOUS AS NEEDED
OUTPATIENT
Start: 2025-08-20

## 2025-08-20 RX ORDER — DIPHENHYDRAMINE HYDROCHLORIDE 50 MG/ML
50 INJECTION, SOLUTION INTRAMUSCULAR; INTRAVENOUS AS NEEDED
Status: DISCONTINUED | OUTPATIENT
Start: 2025-08-20 | End: 2025-08-20 | Stop reason: HOSPADM

## 2025-08-20 RX ORDER — PROCHLORPERAZINE EDISYLATE 5 MG/ML
10 INJECTION INTRAMUSCULAR; INTRAVENOUS EVERY 6 HOURS PRN
Status: DISCONTINUED | OUTPATIENT
Start: 2025-08-20 | End: 2025-08-20 | Stop reason: HOSPADM

## 2025-08-20 RX ORDER — DIPHENHYDRAMINE HCL 50 MG
50 CAPSULE ORAL ONCE
Status: COMPLETED | OUTPATIENT
Start: 2025-08-20 | End: 2025-08-20

## 2025-08-20 RX ORDER — PROCHLORPERAZINE MALEATE 10 MG
10 TABLET ORAL EVERY 6 HOURS PRN
Status: DISCONTINUED | OUTPATIENT
Start: 2025-08-20 | End: 2025-08-20 | Stop reason: HOSPADM

## 2025-08-20 RX ORDER — HEPARIN SODIUM,PORCINE/PF 10 UNIT/ML
50 SYRINGE (ML) INTRAVENOUS AS NEEDED
OUTPATIENT
Start: 2025-08-20

## 2025-08-20 RX ORDER — HEPARIN 100 UNIT/ML
500 SYRINGE INTRAVENOUS AS NEEDED
Status: DISCONTINUED | OUTPATIENT
Start: 2025-08-20 | End: 2025-08-20 | Stop reason: HOSPADM

## 2025-08-20 RX ORDER — PALONOSETRON 0.05 MG/ML
0.25 INJECTION, SOLUTION INTRAVENOUS ONCE
Status: COMPLETED | OUTPATIENT
Start: 2025-08-20 | End: 2025-08-20

## 2025-08-20 RX ORDER — EPINEPHRINE 0.3 MG/.3ML
0.3 INJECTION SUBCUTANEOUS EVERY 5 MIN PRN
Status: DISCONTINUED | OUTPATIENT
Start: 2025-08-20 | End: 2025-08-20 | Stop reason: HOSPADM

## 2025-08-20 RX ADMIN — PACLITAXEL 132 MG: 6 INJECTION, SOLUTION INTRAVENOUS at 09:55

## 2025-08-20 RX ADMIN — FAMOTIDINE 20 MG: 10 INJECTION INTRAVENOUS at 09:18

## 2025-08-20 RX ADMIN — DEXAMETHASONE SODIUM PHOSPHATE 12 MG: 10 INJECTION, SOLUTION INTRAMUSCULAR; INTRAVENOUS at 09:18

## 2025-08-20 RX ADMIN — CARBOPLATIN 150 MG: 10 INJECTION, SOLUTION INTRAVENOUS at 10:57

## 2025-08-20 RX ADMIN — HEPARIN 500 UNITS: 100 SYRINGE at 12:46

## 2025-08-20 RX ADMIN — DIPHENHYDRAMINE HYDROCHLORIDE 50 MG: 50 CAPSULE ORAL at 09:18

## 2025-08-20 RX ADMIN — PALONOSETRON HYDROCHLORIDE 0.25 MG: 0.25 INJECTION INTRAVENOUS at 09:18

## 2025-08-22 ENCOUNTER — NURSE TRIAGE (OUTPATIENT)
Dept: ADMISSION | Facility: HOSPITAL | Age: 60
End: 2025-08-22
Payer: COMMERCIAL

## 2025-08-27 ENCOUNTER — INFUSION (OUTPATIENT)
Dept: HEMATOLOGY/ONCOLOGY | Facility: HOSPITAL | Age: 60
End: 2025-08-27
Payer: COMMERCIAL

## 2025-08-27 ENCOUNTER — NUTRITION (OUTPATIENT)
Dept: HEMATOLOGY/ONCOLOGY | Facility: HOSPITAL | Age: 60
End: 2025-08-27
Payer: COMMERCIAL

## 2025-08-27 VITALS
HEART RATE: 70 BPM | TEMPERATURE: 97.2 F | RESPIRATION RATE: 18 BRPM | SYSTOLIC BLOOD PRESSURE: 134 MMHG | WEIGHT: 130 LBS | DIASTOLIC BLOOD PRESSURE: 84 MMHG | OXYGEN SATURATION: 100 % | BODY MASS INDEX: 22.3 KG/M2

## 2025-08-27 DIAGNOSIS — C50.212 MALIGNANT NEOPLASM OF UPPER-INNER QUADRANT OF LEFT BREAST IN FEMALE, ESTROGEN RECEPTOR NEGATIVE: ICD-10-CM

## 2025-08-27 DIAGNOSIS — Z17.1 MALIGNANT NEOPLASM OF UPPER-INNER QUADRANT OF LEFT BREAST IN FEMALE, ESTROGEN RECEPTOR NEGATIVE: ICD-10-CM

## 2025-08-27 LAB
ALBUMIN SERPL BCP-MCNC: 4.1 G/DL (ref 3.4–5)
ALP SERPL-CCNC: 47 U/L (ref 33–110)
ALT SERPL W P-5'-P-CCNC: 16 U/L (ref 7–45)
ANION GAP SERPL CALC-SCNC: 10 MMOL/L (ref 10–20)
AST SERPL W P-5'-P-CCNC: 15 U/L (ref 9–39)
BASOPHILS # BLD AUTO: 0.01 X10*3/UL (ref 0–0.1)
BASOPHILS NFR BLD AUTO: 0.4 %
BILIRUB SERPL-MCNC: 0.4 MG/DL (ref 0–1.2)
BUN SERPL-MCNC: 15 MG/DL (ref 6–23)
CALCIUM SERPL-MCNC: 9.2 MG/DL (ref 8.6–10.3)
CHLORIDE SERPL-SCNC: 107 MMOL/L (ref 98–107)
CO2 SERPL-SCNC: 27 MMOL/L (ref 21–32)
CREAT SERPL-MCNC: 0.7 MG/DL (ref 0.5–1.05)
EGFRCR SERPLBLD CKD-EPI 2021: >90 ML/MIN/1.73M*2
EOSINOPHIL # BLD AUTO: 0.01 X10*3/UL (ref 0–0.7)
EOSINOPHIL NFR BLD AUTO: 0.4 %
ERYTHROCYTE [DISTWIDTH] IN BLOOD BY AUTOMATED COUNT: 13.6 % (ref 11.5–14.5)
GLUCOSE SERPL-MCNC: 93 MG/DL (ref 74–99)
HCT VFR BLD AUTO: 34.8 % (ref 36–46)
HGB BLD-MCNC: 12 G/DL (ref 12–16)
IMM GRANULOCYTES # BLD AUTO: 0.01 X10*3/UL (ref 0–0.7)
IMM GRANULOCYTES NFR BLD AUTO: 0.4 % (ref 0–0.9)
LYMPHOCYTES # BLD AUTO: 0.73 X10*3/UL (ref 1.2–4.8)
LYMPHOCYTES NFR BLD AUTO: 28.7 %
MCH RBC QN AUTO: 32.7 PG (ref 26–34)
MCHC RBC AUTO-ENTMCNC: 34.5 G/DL (ref 32–36)
MCV RBC AUTO: 95 FL (ref 80–100)
MONOCYTES # BLD AUTO: 0.23 X10*3/UL (ref 0.1–1)
MONOCYTES NFR BLD AUTO: 9.1 %
NEUTROPHILS # BLD AUTO: 1.55 X10*3/UL (ref 1.2–7.7)
NEUTROPHILS NFR BLD AUTO: 61 %
NRBC BLD-RTO: 0 /100 WBCS (ref 0–0)
PLATELET # BLD AUTO: 138 X10*3/UL (ref 150–450)
POTASSIUM SERPL-SCNC: 4.1 MMOL/L (ref 3.5–5.3)
PROT SERPL-MCNC: 6.6 G/DL (ref 6.4–8.2)
RBC # BLD AUTO: 3.67 X10*6/UL (ref 4–5.2)
SODIUM SERPL-SCNC: 140 MMOL/L (ref 136–145)
WBC # BLD AUTO: 2.5 X10*3/UL (ref 4.4–11.3)

## 2025-08-27 PROCEDURE — 2500000004 HC RX 250 GENERAL PHARMACY W/ HCPCS (ALT 636 FOR OP/ED): Performed by: INTERNAL MEDICINE

## 2025-08-27 PROCEDURE — 2500000001 HC RX 250 WO HCPCS SELF ADMINISTERED DRUGS (ALT 637 FOR MEDICARE OP): Performed by: INTERNAL MEDICINE

## 2025-08-27 PROCEDURE — 85025 COMPLETE CBC W/AUTO DIFF WBC: CPT

## 2025-08-27 PROCEDURE — 84075 ASSAY ALKALINE PHOSPHATASE: CPT

## 2025-08-27 PROCEDURE — 96375 TX/PRO/DX INJ NEW DRUG ADDON: CPT | Mod: INF

## 2025-08-27 PROCEDURE — 96417 CHEMO IV INFUS EACH ADDL SEQ: CPT

## 2025-08-27 PROCEDURE — 96413 CHEMO IV INFUSION 1 HR: CPT

## 2025-08-27 RX ORDER — ALBUTEROL SULFATE 0.83 MG/ML
3 SOLUTION RESPIRATORY (INHALATION) AS NEEDED
Status: DISCONTINUED | OUTPATIENT
Start: 2025-08-27 | End: 2025-08-27 | Stop reason: HOSPADM

## 2025-08-27 RX ORDER — EPINEPHRINE 0.3 MG/.3ML
0.3 INJECTION SUBCUTANEOUS EVERY 5 MIN PRN
Status: DISCONTINUED | OUTPATIENT
Start: 2025-08-27 | End: 2025-08-27 | Stop reason: HOSPADM

## 2025-08-27 RX ORDER — HEPARIN 100 UNIT/ML
500 SYRINGE INTRAVENOUS AS NEEDED
OUTPATIENT
Start: 2025-08-27

## 2025-08-27 RX ORDER — PALONOSETRON 0.05 MG/ML
0.25 INJECTION, SOLUTION INTRAVENOUS ONCE
Status: COMPLETED | OUTPATIENT
Start: 2025-08-27 | End: 2025-08-27

## 2025-08-27 RX ORDER — FAMOTIDINE 10 MG/ML
20 INJECTION, SOLUTION INTRAVENOUS ONCE AS NEEDED
Status: DISCONTINUED | OUTPATIENT
Start: 2025-08-27 | End: 2025-08-27 | Stop reason: HOSPADM

## 2025-08-27 RX ORDER — HEPARIN SODIUM,PORCINE/PF 10 UNIT/ML
50 SYRINGE (ML) INTRAVENOUS AS NEEDED
OUTPATIENT
Start: 2025-08-27

## 2025-08-27 RX ORDER — PROCHLORPERAZINE MALEATE 10 MG
10 TABLET ORAL EVERY 6 HOURS PRN
Status: DISCONTINUED | OUTPATIENT
Start: 2025-08-27 | End: 2025-08-27 | Stop reason: HOSPADM

## 2025-08-27 RX ORDER — PROCHLORPERAZINE EDISYLATE 5 MG/ML
10 INJECTION INTRAMUSCULAR; INTRAVENOUS EVERY 6 HOURS PRN
Status: DISCONTINUED | OUTPATIENT
Start: 2025-08-27 | End: 2025-08-27 | Stop reason: HOSPADM

## 2025-08-27 RX ORDER — DIPHENHYDRAMINE HYDROCHLORIDE 50 MG/ML
50 INJECTION, SOLUTION INTRAMUSCULAR; INTRAVENOUS AS NEEDED
Status: DISCONTINUED | OUTPATIENT
Start: 2025-08-27 | End: 2025-08-27 | Stop reason: HOSPADM

## 2025-08-27 RX ORDER — FAMOTIDINE 10 MG/ML
20 INJECTION, SOLUTION INTRAVENOUS ONCE
Status: COMPLETED | OUTPATIENT
Start: 2025-08-27 | End: 2025-08-27

## 2025-08-27 RX ORDER — HEPARIN 100 UNIT/ML
500 SYRINGE INTRAVENOUS AS NEEDED
Status: DISCONTINUED | OUTPATIENT
Start: 2025-08-27 | End: 2025-08-27 | Stop reason: HOSPADM

## 2025-08-27 RX ORDER — DIPHENHYDRAMINE HCL 50 MG
50 CAPSULE ORAL ONCE
Status: COMPLETED | OUTPATIENT
Start: 2025-08-27 | End: 2025-08-27

## 2025-08-27 RX ADMIN — DIPHENHYDRAMINE HYDROCHLORIDE 50 MG: 50 CAPSULE ORAL at 09:47

## 2025-08-27 RX ADMIN — PACLITAXEL 132 MG: 6 INJECTION, SOLUTION INTRAVENOUS at 10:20

## 2025-08-27 RX ADMIN — PALONOSETRON HYDROCHLORIDE 0.25 MG: 0.25 INJECTION INTRAVENOUS at 09:50

## 2025-08-27 RX ADMIN — CARBOPLATIN 160 MG: 10 INJECTION, SOLUTION INTRAVENOUS at 11:28

## 2025-08-27 RX ADMIN — FAMOTIDINE 20 MG: 10 INJECTION INTRAVENOUS at 09:48

## 2025-08-27 RX ADMIN — DEXAMETHASONE SODIUM PHOSPHATE 12 MG: 10 INJECTION, SOLUTION INTRAMUSCULAR; INTRAVENOUS at 09:57

## 2025-08-27 RX ADMIN — HEPARIN 500 UNITS: 100 SYRINGE at 12:51

## 2025-09-02 ENCOUNTER — APPOINTMENT (OUTPATIENT)
Dept: HEMATOLOGY/ONCOLOGY | Facility: HOSPITAL | Age: 60
End: 2025-09-02
Payer: COMMERCIAL

## 2025-09-02 ENCOUNTER — OFFICE VISIT (OUTPATIENT)
Dept: HEMATOLOGY/ONCOLOGY | Facility: HOSPITAL | Age: 60
End: 2025-09-02
Payer: COMMERCIAL

## 2025-09-02 VITALS
HEART RATE: 87 BPM | BODY MASS INDEX: 22.17 KG/M2 | DIASTOLIC BLOOD PRESSURE: 71 MMHG | OXYGEN SATURATION: 98 % | WEIGHT: 129.85 LBS | HEIGHT: 64 IN | TEMPERATURE: 97.2 F | SYSTOLIC BLOOD PRESSURE: 110 MMHG | RESPIRATION RATE: 16 BRPM

## 2025-09-02 DIAGNOSIS — C50.212 MALIGNANT NEOPLASM OF UPPER-INNER QUADRANT OF LEFT BREAST IN FEMALE, ESTROGEN RECEPTOR NEGATIVE: Primary | ICD-10-CM

## 2025-09-02 DIAGNOSIS — Z17.1 MALIGNANT NEOPLASM OF UPPER-INNER QUADRANT OF LEFT BREAST IN FEMALE, ESTROGEN RECEPTOR NEGATIVE: Primary | ICD-10-CM

## 2025-09-02 PROCEDURE — 99214 OFFICE O/P EST MOD 30 MIN: CPT | Performed by: INTERNAL MEDICINE

## 2025-09-02 PROCEDURE — 3008F BODY MASS INDEX DOCD: CPT | Performed by: INTERNAL MEDICINE

## 2025-09-02 RX ORDER — PROCHLORPERAZINE MALEATE 10 MG
10 TABLET ORAL EVERY 6 HOURS PRN
OUTPATIENT
Start: 2025-09-16

## 2025-09-02 RX ORDER — ALBUTEROL SULFATE 0.83 MG/ML
3 SOLUTION RESPIRATORY (INHALATION) AS NEEDED
OUTPATIENT
Start: 2025-09-10

## 2025-09-02 RX ORDER — PROCHLORPERAZINE MALEATE 10 MG
10 TABLET ORAL EVERY 6 HOURS PRN
OUTPATIENT
Start: 2025-09-10

## 2025-09-02 RX ORDER — EPINEPHRINE 0.3 MG/.3ML
0.3 INJECTION SUBCUTANEOUS EVERY 5 MIN PRN
OUTPATIENT
Start: 2025-09-16

## 2025-09-02 RX ORDER — ALBUTEROL SULFATE 0.83 MG/ML
3 SOLUTION RESPIRATORY (INHALATION) AS NEEDED
Status: CANCELLED | OUTPATIENT
Start: 2025-09-02

## 2025-09-02 RX ORDER — DIPHENHYDRAMINE HYDROCHLORIDE 50 MG/ML
50 INJECTION, SOLUTION INTRAMUSCULAR; INTRAVENOUS AS NEEDED
OUTPATIENT
Start: 2025-09-16

## 2025-09-02 RX ORDER — EPINEPHRINE 0.3 MG/.3ML
0.3 INJECTION SUBCUTANEOUS EVERY 5 MIN PRN
Status: CANCELLED | OUTPATIENT
Start: 2025-09-02

## 2025-09-02 RX ORDER — FAMOTIDINE 10 MG/ML
20 INJECTION, SOLUTION INTRAVENOUS ONCE AS NEEDED
OUTPATIENT
Start: 2025-09-10

## 2025-09-02 RX ORDER — FAMOTIDINE 10 MG/ML
20 INJECTION, SOLUTION INTRAVENOUS ONCE
OUTPATIENT
Start: 2025-09-16

## 2025-09-02 RX ORDER — PROCHLORPERAZINE EDISYLATE 5 MG/ML
10 INJECTION INTRAMUSCULAR; INTRAVENOUS EVERY 6 HOURS PRN
OUTPATIENT
Start: 2025-09-16

## 2025-09-02 RX ORDER — DIPHENHYDRAMINE HYDROCHLORIDE 50 MG/ML
50 INJECTION, SOLUTION INTRAMUSCULAR; INTRAVENOUS AS NEEDED
Status: CANCELLED | OUTPATIENT
Start: 2025-09-02

## 2025-09-02 RX ORDER — DIPHENHYDRAMINE HCL 50 MG
50 CAPSULE ORAL ONCE
OUTPATIENT
Start: 2025-09-16

## 2025-09-02 RX ORDER — PALONOSETRON 0.05 MG/ML
0.25 INJECTION, SOLUTION INTRAVENOUS ONCE
Status: CANCELLED | OUTPATIENT
Start: 2025-09-02

## 2025-09-02 RX ORDER — FAMOTIDINE 10 MG/ML
20 INJECTION, SOLUTION INTRAVENOUS ONCE AS NEEDED
OUTPATIENT
Start: 2025-09-16

## 2025-09-02 RX ORDER — PALONOSETRON 0.05 MG/ML
0.25 INJECTION, SOLUTION INTRAVENOUS ONCE
OUTPATIENT
Start: 2025-09-16

## 2025-09-02 RX ORDER — FAMOTIDINE 10 MG/ML
20 INJECTION, SOLUTION INTRAVENOUS ONCE
OUTPATIENT
Start: 2025-09-10

## 2025-09-02 RX ORDER — ALBUTEROL SULFATE 0.83 MG/ML
3 SOLUTION RESPIRATORY (INHALATION) AS NEEDED
OUTPATIENT
Start: 2025-09-16

## 2025-09-02 RX ORDER — FAMOTIDINE 10 MG/ML
20 INJECTION, SOLUTION INTRAVENOUS ONCE AS NEEDED
Status: CANCELLED | OUTPATIENT
Start: 2025-09-02

## 2025-09-02 RX ORDER — PROCHLORPERAZINE MALEATE 10 MG
10 TABLET ORAL EVERY 6 HOURS PRN
Status: CANCELLED | OUTPATIENT
Start: 2025-09-02

## 2025-09-02 RX ORDER — PALONOSETRON 0.05 MG/ML
0.25 INJECTION, SOLUTION INTRAVENOUS ONCE
OUTPATIENT
Start: 2025-09-10

## 2025-09-02 RX ORDER — PROCHLORPERAZINE EDISYLATE 5 MG/ML
10 INJECTION INTRAMUSCULAR; INTRAVENOUS EVERY 6 HOURS PRN
OUTPATIENT
Start: 2025-09-10

## 2025-09-02 RX ORDER — DIPHENHYDRAMINE HCL 50 MG
50 CAPSULE ORAL ONCE
Status: CANCELLED | OUTPATIENT
Start: 2025-09-02

## 2025-09-02 RX ORDER — DIPHENHYDRAMINE HYDROCHLORIDE 50 MG/ML
50 INJECTION, SOLUTION INTRAMUSCULAR; INTRAVENOUS AS NEEDED
OUTPATIENT
Start: 2025-09-10

## 2025-09-02 RX ORDER — EPINEPHRINE 0.3 MG/.3ML
0.3 INJECTION SUBCUTANEOUS EVERY 5 MIN PRN
OUTPATIENT
Start: 2025-09-10

## 2025-09-02 RX ORDER — PROCHLORPERAZINE EDISYLATE 5 MG/ML
10 INJECTION INTRAMUSCULAR; INTRAVENOUS EVERY 6 HOURS PRN
Status: CANCELLED | OUTPATIENT
Start: 2025-09-02

## 2025-09-02 RX ORDER — FAMOTIDINE 10 MG/ML
20 INJECTION, SOLUTION INTRAVENOUS ONCE
Status: CANCELLED | OUTPATIENT
Start: 2025-09-02

## 2025-09-02 RX ORDER — DIPHENHYDRAMINE HCL 50 MG
50 CAPSULE ORAL ONCE
OUTPATIENT
Start: 2025-09-10

## 2025-09-02 ASSESSMENT — PAIN SCALES - GENERAL: PAINLEVEL_OUTOF10: 0-NO PAIN

## 2025-09-03 ENCOUNTER — INFUSION (OUTPATIENT)
Dept: HEMATOLOGY/ONCOLOGY | Facility: HOSPITAL | Age: 60
End: 2025-09-03
Payer: COMMERCIAL

## 2025-09-03 VITALS
DIASTOLIC BLOOD PRESSURE: 77 MMHG | OXYGEN SATURATION: 100 % | WEIGHT: 128.75 LBS | SYSTOLIC BLOOD PRESSURE: 116 MMHG | HEART RATE: 70 BPM | TEMPERATURE: 97 F | BODY MASS INDEX: 22.09 KG/M2 | RESPIRATION RATE: 18 BRPM

## 2025-09-03 DIAGNOSIS — C50.212 MALIGNANT NEOPLASM OF UPPER-INNER QUADRANT OF LEFT BREAST IN FEMALE, ESTROGEN RECEPTOR NEGATIVE: ICD-10-CM

## 2025-09-03 DIAGNOSIS — Z17.1 MALIGNANT NEOPLASM OF UPPER-INNER QUADRANT OF LEFT BREAST IN FEMALE, ESTROGEN RECEPTOR NEGATIVE: ICD-10-CM

## 2025-09-03 LAB
ALBUMIN SERPL BCP-MCNC: 4 G/DL (ref 3.4–5)
ALP SERPL-CCNC: 48 U/L (ref 33–110)
ALT SERPL W P-5'-P-CCNC: 16 U/L (ref 7–45)
ANION GAP SERPL CALC-SCNC: 11 MMOL/L (ref 10–20)
AST SERPL W P-5'-P-CCNC: 16 U/L (ref 9–39)
BASOPHILS # BLD AUTO: 0 X10*3/UL (ref 0–0.1)
BASOPHILS NFR BLD AUTO: 0 %
BILIRUB SERPL-MCNC: 0.4 MG/DL (ref 0–1.2)
BUN SERPL-MCNC: 18 MG/DL (ref 6–23)
CALCIUM SERPL-MCNC: 8.9 MG/DL (ref 8.6–10.3)
CHLORIDE SERPL-SCNC: 105 MMOL/L (ref 98–107)
CO2 SERPL-SCNC: 26 MMOL/L (ref 21–32)
CREAT SERPL-MCNC: 0.77 MG/DL (ref 0.5–1.05)
EGFRCR SERPLBLD CKD-EPI 2021: 89 ML/MIN/1.73M*2
EOSINOPHIL # BLD AUTO: 0.01 X10*3/UL (ref 0–0.7)
EOSINOPHIL NFR BLD AUTO: 0.4 %
ERYTHROCYTE [DISTWIDTH] IN BLOOD BY AUTOMATED COUNT: 14.2 % (ref 11.5–14.5)
GLUCOSE SERPL-MCNC: 102 MG/DL (ref 74–99)
HCT VFR BLD AUTO: 34 % (ref 36–46)
HGB BLD-MCNC: 11.7 G/DL (ref 12–16)
IMM GRANULOCYTES # BLD AUTO: 0.01 X10*3/UL (ref 0–0.7)
IMM GRANULOCYTES NFR BLD AUTO: 0.4 % (ref 0–0.9)
LYMPHOCYTES # BLD AUTO: 0.65 X10*3/UL (ref 1.2–4.8)
LYMPHOCYTES NFR BLD AUTO: 27 %
MCH RBC QN AUTO: 32.5 PG (ref 26–34)
MCHC RBC AUTO-ENTMCNC: 34.4 G/DL (ref 32–36)
MCV RBC AUTO: 94 FL (ref 80–100)
MONOCYTES # BLD AUTO: 0.26 X10*3/UL (ref 0.1–1)
MONOCYTES NFR BLD AUTO: 10.8 %
NEUTROPHILS # BLD AUTO: 1.48 X10*3/UL (ref 1.2–7.7)
NEUTROPHILS NFR BLD AUTO: 61.4 %
NRBC BLD-RTO: 0 /100 WBCS (ref 0–0)
PLATELET # BLD AUTO: 155 X10*3/UL (ref 150–450)
POTASSIUM SERPL-SCNC: 4.1 MMOL/L (ref 3.5–5.3)
PROT SERPL-MCNC: 6.3 G/DL (ref 6.4–8.2)
RBC # BLD AUTO: 3.6 X10*6/UL (ref 4–5.2)
SODIUM SERPL-SCNC: 138 MMOL/L (ref 136–145)
TSH SERPL-ACNC: 0.9 MIU/L (ref 0.44–3.98)
WBC # BLD AUTO: 2.4 X10*3/UL (ref 4.4–11.3)

## 2025-09-03 PROCEDURE — 2500000001 HC RX 250 WO HCPCS SELF ADMINISTERED DRUGS (ALT 637 FOR MEDICARE OP): Performed by: INTERNAL MEDICINE

## 2025-09-03 PROCEDURE — 80053 COMPREHEN METABOLIC PANEL: CPT

## 2025-09-03 PROCEDURE — 96413 CHEMO IV INFUSION 1 HR: CPT

## 2025-09-03 PROCEDURE — 85025 COMPLETE CBC W/AUTO DIFF WBC: CPT

## 2025-09-03 PROCEDURE — 2500000004 HC RX 250 GENERAL PHARMACY W/ HCPCS (ALT 636 FOR OP/ED): Performed by: INTERNAL MEDICINE

## 2025-09-03 PROCEDURE — 84443 ASSAY THYROID STIM HORMONE: CPT

## 2025-09-03 PROCEDURE — 96417 CHEMO IV INFUS EACH ADDL SEQ: CPT

## 2025-09-03 PROCEDURE — 96375 TX/PRO/DX INJ NEW DRUG ADDON: CPT | Mod: INF

## 2025-09-03 PROCEDURE — 96415 CHEMO IV INFUSION ADDL HR: CPT

## 2025-09-03 RX ORDER — HEPARIN 100 UNIT/ML
500 SYRINGE INTRAVENOUS AS NEEDED
Status: DISCONTINUED | OUTPATIENT
Start: 2025-09-03 | End: 2025-09-03 | Stop reason: HOSPADM

## 2025-09-03 RX ORDER — FAMOTIDINE 10 MG/ML
20 INJECTION, SOLUTION INTRAVENOUS ONCE AS NEEDED
Status: DISCONTINUED | OUTPATIENT
Start: 2025-09-03 | End: 2025-09-03 | Stop reason: HOSPADM

## 2025-09-03 RX ORDER — ALBUTEROL SULFATE 0.83 MG/ML
3 SOLUTION RESPIRATORY (INHALATION) AS NEEDED
Status: DISCONTINUED | OUTPATIENT
Start: 2025-09-03 | End: 2025-09-03 | Stop reason: HOSPADM

## 2025-09-03 RX ORDER — HEPARIN SODIUM,PORCINE/PF 10 UNIT/ML
50 SYRINGE (ML) INTRAVENOUS AS NEEDED
Status: DISCONTINUED | OUTPATIENT
Start: 2025-09-03 | End: 2025-09-03 | Stop reason: HOSPADM

## 2025-09-03 RX ORDER — EPINEPHRINE 0.3 MG/.3ML
0.3 INJECTION SUBCUTANEOUS EVERY 5 MIN PRN
Status: DISCONTINUED | OUTPATIENT
Start: 2025-09-03 | End: 2025-09-03 | Stop reason: HOSPADM

## 2025-09-03 RX ORDER — DIPHENHYDRAMINE HYDROCHLORIDE 50 MG/ML
50 INJECTION, SOLUTION INTRAMUSCULAR; INTRAVENOUS AS NEEDED
Status: DISCONTINUED | OUTPATIENT
Start: 2025-09-03 | End: 2025-09-03 | Stop reason: HOSPADM

## 2025-09-03 RX ORDER — FAMOTIDINE 10 MG/ML
20 INJECTION, SOLUTION INTRAVENOUS ONCE
Status: COMPLETED | OUTPATIENT
Start: 2025-09-03 | End: 2025-09-03

## 2025-09-03 RX ORDER — HEPARIN SODIUM,PORCINE/PF 10 UNIT/ML
50 SYRINGE (ML) INTRAVENOUS AS NEEDED
OUTPATIENT
Start: 2025-09-03

## 2025-09-03 RX ORDER — PROCHLORPERAZINE EDISYLATE 5 MG/ML
10 INJECTION INTRAMUSCULAR; INTRAVENOUS EVERY 6 HOURS PRN
Status: DISCONTINUED | OUTPATIENT
Start: 2025-09-03 | End: 2025-09-03 | Stop reason: HOSPADM

## 2025-09-03 RX ORDER — DIPHENHYDRAMINE HCL 50 MG
50 CAPSULE ORAL ONCE
Status: COMPLETED | OUTPATIENT
Start: 2025-09-03 | End: 2025-09-03

## 2025-09-03 RX ORDER — HEPARIN 100 UNIT/ML
500 SYRINGE INTRAVENOUS AS NEEDED
OUTPATIENT
Start: 2025-09-03

## 2025-09-03 RX ORDER — PROCHLORPERAZINE MALEATE 10 MG
10 TABLET ORAL EVERY 6 HOURS PRN
Status: DISCONTINUED | OUTPATIENT
Start: 2025-09-03 | End: 2025-09-03 | Stop reason: HOSPADM

## 2025-09-03 RX ORDER — PALONOSETRON 0.05 MG/ML
0.25 INJECTION, SOLUTION INTRAVENOUS ONCE
Status: COMPLETED | OUTPATIENT
Start: 2025-09-03 | End: 2025-09-03

## 2025-09-03 RX ADMIN — DIPHENHYDRAMINE HYDROCHLORIDE 50 MG: 50 CAPSULE ORAL at 09:46

## 2025-09-03 RX ADMIN — SODIUM CHLORIDE 200 MG: 9 INJECTION, SOLUTION INTRAVENOUS at 10:17

## 2025-09-03 RX ADMIN — DEXAMETHASONE SODIUM PHOSPHATE 12 MG: 10 INJECTION, SOLUTION INTRAMUSCULAR; INTRAVENOUS at 09:41

## 2025-09-03 RX ADMIN — PALONOSETRON HYDROCHLORIDE 0.25 MG: 0.25 INJECTION INTRAVENOUS at 09:46

## 2025-09-03 RX ADMIN — FAMOTIDINE 20 MG: 10 INJECTION INTRAVENOUS at 09:46

## 2025-09-03 RX ADMIN — CARBOPLATIN 145 MG: 10 INJECTION, SOLUTION INTRAVENOUS at 12:26

## 2025-09-03 RX ADMIN — PACLITAXEL 132 MG: 6 INJECTION, SOLUTION INTRAVENOUS at 11:05

## 2025-09-03 RX ADMIN — HEPARIN 500 UNITS: 100 SYRINGE at 14:26

## 2025-09-23 ENCOUNTER — APPOINTMENT (OUTPATIENT)
Dept: HEMATOLOGY/ONCOLOGY | Facility: HOSPITAL | Age: 60
End: 2025-09-23
Payer: COMMERCIAL

## 2025-09-30 ENCOUNTER — APPOINTMENT (OUTPATIENT)
Dept: HEMATOLOGY/ONCOLOGY | Facility: HOSPITAL | Age: 60
End: 2025-09-30
Payer: COMMERCIAL

## 2025-10-07 ENCOUNTER — APPOINTMENT (OUTPATIENT)
Dept: HEMATOLOGY/ONCOLOGY | Facility: HOSPITAL | Age: 60
End: 2025-10-07
Payer: COMMERCIAL

## 2025-12-03 ENCOUNTER — APPOINTMENT (OUTPATIENT)
Dept: DERMATOLOGY | Facility: CLINIC | Age: 60
End: 2025-12-03
Payer: COMMERCIAL

## 2026-08-17 ENCOUNTER — APPOINTMENT (OUTPATIENT)
Facility: CLINIC | Age: 61
End: 2026-08-17
Payer: COMMERCIAL